# Patient Record
Sex: FEMALE | Race: WHITE | NOT HISPANIC OR LATINO | Employment: OTHER | URBAN - NONMETROPOLITAN AREA
[De-identification: names, ages, dates, MRNs, and addresses within clinical notes are randomized per-mention and may not be internally consistent; named-entity substitution may affect disease eponyms.]

---

## 2017-01-12 ENCOUNTER — OFFICE VISIT (OUTPATIENT)
Dept: OTOLARYNGOLOGY | Facility: CLINIC | Age: 67
End: 2017-01-12

## 2017-01-12 VITALS
SYSTOLIC BLOOD PRESSURE: 130 MMHG | HEIGHT: 62 IN | TEMPERATURE: 97.6 F | DIASTOLIC BLOOD PRESSURE: 80 MMHG | BODY MASS INDEX: 22.08 KG/M2 | WEIGHT: 120 LBS | HEART RATE: 68 BPM

## 2017-01-12 DIAGNOSIS — Q18.0 BRANCHIAL CLEFT CYST: Primary | ICD-10-CM

## 2017-01-12 PROCEDURE — 99213 OFFICE O/P EST LOW 20 MIN: CPT | Performed by: PHYSICIAN ASSISTANT

## 2017-01-12 RX ORDER — ALBUTEROL SULFATE 90 UG/1
AEROSOL, METERED RESPIRATORY (INHALATION) EVERY 6 HOURS
COMMUNITY
Start: 2016-12-20 | End: 2020-02-12 | Stop reason: SDUPTHER

## 2017-01-12 NOTE — MR AVS SNAPSHOT
"                        Viky Cortez   1/12/2017 1:00 PM   Office Visit    Dept Phone:  [phone]   Encounter #:  [id number]    Provider:  Quang Lopez MD   Department:  [facility]                Your Full Care Plan              Today's Medication Changes          These changes are accurate as of: 1/12/17  1:03 PM.  If you have any questions, ask your nurse or doctor.               Stop taking medication(s)listed here:     HYDROcodone-acetaminophen 5-325 MG per tablet   Commonly known as:  NORCO   Stopped by:  Quang Lopez MD                      Your Updated Medication List          This list is accurate as of: 1/12/17  1:03 PM.  Always use your most recent med list.                alendronate 70 MG tablet   Commonly known as:  FOSAMAX       lisinopril-hydrochlorothiazide 10-12.5 MG per tablet   Commonly known as:  PRINZIDE,ZESTORETIC       MULTIVITAMIN ADULT PO       PROAIR  (90 BASE) MCG/ACT inhaler   Generic drug:  albuterol               Instructions     None    Patient Instructions History      Upcoming Appointments     Visit Type Date Time Department    FOLLOW UP 1/12/2017  1:00 PM [facility]      MyChart Signup     Our records indicate that you have declined [facility] signup. If you would like to sign up for [employer], please email [email or web address] or call [phone] to obtain an activation code.             Other Info from Your Visit           Allergies     Influenza Vaccines        Reason for Visit     Follow-up Neck mass      Vital Signs     Blood Pressure Pulse Temperature Height Weight Body Mass Index    130/80 68 97.6 °F (36.4 °C) 62\" (157.5 cm) 120 lb (54.4 kg) 21.95 kg/m2    Smoking Status                   Current Every Day Smoker             "

## 2017-01-12 NOTE — PROGRESS NOTES
Patient Care Team:  Mert Cruz MD as PCP - General (Family Medicine)  Quang Lopez MD as Consulting Physician (Otolaryngology)    Chief Complaint   Patient presents with   • Follow-up     Neck mass        Subjective     Viky Cortez is a 66 y.o. female who presents for evaluation.  HPI Comments: Patient presents status post removal of a branchial cleft cyst on the right side. She has been doing well without complaints. Surgical site has healed well.      Review of Systems  Review of Systems   Constitutional: Negative for activity change, appetite change, chills, diaphoresis, fatigue, fever and unexpected weight change.   HENT: Negative for congestion, dental problem, drooling, ear discharge, ear pain, facial swelling, hearing loss, mouth sores, nosebleeds, postnasal drip, rhinorrhea, sinus pressure, sneezing, sore throat, tinnitus, trouble swallowing and voice change.    Eyes: Negative.    Respiratory: Negative.    Cardiovascular: Negative.    Gastrointestinal: Negative.    Endocrine: Negative.    Skin: Negative.    Allergic/Immunologic: Negative for environmental allergies, food allergies and immunocompromised state.   Neurological: Negative.    Hematological: Negative.    Psychiatric/Behavioral: Negative.        History  Past Medical History   Diagnosis Date   • Hypertension    • Joint pain    • Neck mass      RIGHT SIDE   • Osteoporosis      Past Surgical History   Procedure Laterality Date   • Knee surgery     • Meniscectomy Left    • Panendoscopy N/A 9/30/2016     Procedure: PANENDOSCOPY;  Surgeon: Quang Lopez MD;  Location: [facility] OR;  Service:    • Excision mass head/neck Right 9/30/2016     Procedure: EXCISION MASS HEAD NECK;  Surgeon: Quang Lopez MD;  Location: [facility] OR;  Service:    • Eye surgery       Family History   Problem Relation Age of Onset   • No Known Problems Mother    • No Known Problems Father      Social History   Substance Use Topics   • Smoking status:  Current Every Day Smoker     Packs/day: 1.00     Types: Cigarettes   • Smokeless tobacco: None   • Alcohol use 0.6 oz/week     1 Shots of liquor per week      Comment: once nightly       Current Outpatient Prescriptions:   •  albuterol (PROAIR HFA) 108 (90 BASE) MCG/ACT inhaler, Every 6 (Six) Hours., Disp: , Rfl:   •  alendronate (FOSAMAX) 70 MG tablet, TAKE ONE TABLET BY MOUTH ONCE A WEEK, Disp: , Rfl:   •  lisinopril-hydrochlorothiazide (PRINZIDE,ZESTORETIC) 10-12.5 MG per tablet, Take 1 tablet by mouth Daily., Disp: , Rfl:   •  Multiple Vitamins-Minerals (MULTIVITAMIN ADULT PO), Take  by mouth., Disp: , Rfl:   Allergies:  Influenza vaccines    Objective     Vital Signs  Temp:  [97.6 °F (36.4 °C)] 97.6 °F (36.4 °C)  Heart Rate:  [68] 68  BP: (130)/(80) 130/80    Physical Exam:  Physical Exam   Constitutional: Vital signs are normal. She appears well-developed and well-nourished. No distress.   HENT:   Head: Normocephalic and atraumatic.   Right Ear: External ear normal.   Left Ear: External ear normal.   Mouth/Throat: Uvula is midline, oropharynx is clear and moist and mucous membranes are normal. No oral lesions. No trismus in the jaw. No uvula swelling. No oropharyngeal exudate, posterior oropharyngeal edema, posterior oropharyngeal erythema or tonsillar abscesses. No tonsillar exudate.   Eyes: Conjunctivae and EOM are normal. Pupils are equal, round, and reactive to light. No scleral icterus.   Neck: Trachea normal and phonation normal. No tracheal tenderness present. No tracheal deviation present. No thyroid mass and no thyromegaly present.   Pulmonary/Chest: Effort normal. No stridor.   Lymphadenopathy:        Head (right side): No submental, no submandibular, no tonsillar, no preauricular, no posterior auricular and no occipital adenopathy present.        Head (left side): No submental, no submandibular, no tonsillar, no preauricular, no posterior auricular and no occipital adenopathy present.     She has no  "cervical adenopathy.        Right cervical: No superficial cervical, no deep cervical and no posterior cervical adenopathy present.       Left cervical: No superficial cervical, no deep cervical and no posterior cervical adenopathy present.   Neurological: She is alert. No cranial nerve deficit.   Skin: Skin is warm and dry. No rash noted. She is not diaphoretic. No erythema. No pallor.   Psychiatric: She has a normal mood and affect. Her behavior is normal. Judgment and thought content normal.   Vitals reviewed.      Results Review:   I reviewed the patient's new clinical results.  Tissue Exam   Order: [id number]   Status:  Final result   Visible to patient:  No (Not Released)   Dx:  Mass of right side of neck      3mo ago     Clinical Information       Pre-Op Diagnosis: Mass of right side of neck.       Final Diagnosis   1. Right neck mass, excision:   Inflamed branchial cleft cyst.   Negative for malignancy.      2. Lymph node, right neck, excision:   One reactive lymph node.      1   Electronically signed by Sonu Villalpando MD on 10/6/2016 at 0751   Intraoperative Consultation       Mass, right neck, excision:  Lymphoid tissue and necrotic debris on representative section. Defer to permanent for final diagnosis.     Reported to Dr. Quang Lopez on 9/30/2016 at 3:48 PM.   Gross Description       1. Specimen #1 is received in formalin, labeled with the patient's name, medical record number and designated \"right neck mass.\" The specimen consists of an intact cystic structure measuring 4.6 x 3.1 x 2.9 cm. The external surface is smooth with a small amount of soft tissue adhesions. The external surface is inked blue. The cut surface reveals a cystic structure filled with yellow opaque pasty material. The cyst walls appear smooth. The walls of the cyst average 0.1 cm in thickness. One pole of this cyst shows thicker pink-gray walls averaging 0.4 cm in greatest dimension. A representative section of cyst wall, including " "thicker area, is submitted for frozen section. The frozen section remnant is submitted in (block 1A). The remaining specimen is submitted in (blocks 1B through 1G).   JBT/js     2. Specimen #2 is received in formalin, labeled with the patient's name, date of birth, medical record number and designated \"right neck lymph node\". The specimen consists of two red-pink fragments of lymph node tissue aggregating to 1.3 x 1.1 x 0.8 cm. The external surface shows disruption with cautery artifact. The fragments are totally submitted, intact, in (block 2A).   JBT/ltw    Microscopic Description       1. Sections demonstrate a cystic lesion lined partially by benign squamous epithelium. There is a large area where the epithelial lining is denuded. Numerous macrophages and acute inflammatory cells as well as denuded keratinocytes with reactive atypia are present in this area. No evidence of malignancy is identified.      2. Sections demonstrate benign lymphoid tissue with reactive diffuse hyperplasia and follicular hyperplasia. No evidence of malignancy is identified.   DRW/js             Assessment/Plan     Problems Addressed this Visit        Other    Branchial cleft cyst - Primary          My findings and recommendations were discussed and questions were answered.     Return if symptoms worsen or fail to improve.    TIFFANIE Marks  01/12/17  1:04 PM        "

## 2017-01-23 RX ORDER — ALENDRONATE SODIUM 70 MG/1
TABLET ORAL
Qty: 4 TABLET | Refills: 5 | Status: SHIPPED | OUTPATIENT
Start: 2017-01-23 | End: 2017-08-19 | Stop reason: SDUPTHER

## 2017-04-30 DIAGNOSIS — I10 ESSENTIAL HYPERTENSION: ICD-10-CM

## 2017-05-01 RX ORDER — LISINOPRIL AND HYDROCHLOROTHIAZIDE 12.5; 1 MG/1; MG/1
TABLET ORAL
Qty: 30 TABLET | Refills: 5 | Status: SHIPPED | OUTPATIENT
Start: 2017-05-01 | End: 2017-11-26 | Stop reason: SDUPTHER

## 2017-08-21 RX ORDER — ALENDRONATE SODIUM 70 MG/1
TABLET ORAL
Qty: 4 TABLET | Refills: 5 | Status: SHIPPED | OUTPATIENT
Start: 2017-08-21 | End: 2018-03-23 | Stop reason: SDUPTHER

## 2017-08-23 ENCOUNTER — OFFICE VISIT (OUTPATIENT)
Dept: FAMILY MEDICINE CLINIC | Age: 67
End: 2017-08-23
Payer: MEDICARE

## 2017-08-23 VITALS
WEIGHT: 120 LBS | DIASTOLIC BLOOD PRESSURE: 78 MMHG | RESPIRATION RATE: 16 BRPM | OXYGEN SATURATION: 97 % | TEMPERATURE: 98.2 F | HEIGHT: 62 IN | SYSTOLIC BLOOD PRESSURE: 126 MMHG | HEART RATE: 70 BPM | BODY MASS INDEX: 22.08 KG/M2

## 2017-08-23 DIAGNOSIS — Z12.31 SCREENING MAMMOGRAM, ENCOUNTER FOR: ICD-10-CM

## 2017-08-23 DIAGNOSIS — M81.0 OSTEOPOROSIS, UNSPECIFIED OSTEOPOROSIS TYPE, UNSPECIFIED PATHOLOGICAL FRACTURE PRESENCE: ICD-10-CM

## 2017-08-23 DIAGNOSIS — I10 ESSENTIAL HYPERTENSION: Primary | ICD-10-CM

## 2017-08-23 PROCEDURE — G8399 PT W/DXA RESULTS DOCUMENT: HCPCS | Performed by: FAMILY MEDICINE

## 2017-08-23 PROCEDURE — 4004F PT TOBACCO SCREEN RCVD TLK: CPT | Performed by: FAMILY MEDICINE

## 2017-08-23 PROCEDURE — 99214 OFFICE O/P EST MOD 30 MIN: CPT | Performed by: FAMILY MEDICINE

## 2017-08-23 PROCEDURE — 3014F SCREEN MAMMO DOC REV: CPT | Performed by: FAMILY MEDICINE

## 2017-08-23 PROCEDURE — 1123F ACP DISCUSS/DSCN MKR DOCD: CPT | Performed by: FAMILY MEDICINE

## 2017-08-23 PROCEDURE — 4005F PHARM THX FOR OP RXD: CPT | Performed by: FAMILY MEDICINE

## 2017-08-23 PROCEDURE — G8420 CALC BMI NORM PARAMETERS: HCPCS | Performed by: FAMILY MEDICINE

## 2017-08-23 PROCEDURE — 1090F PRES/ABSN URINE INCON ASSESS: CPT | Performed by: FAMILY MEDICINE

## 2017-08-23 PROCEDURE — 3017F COLORECTAL CA SCREEN DOC REV: CPT | Performed by: FAMILY MEDICINE

## 2017-08-23 PROCEDURE — 4040F PNEUMOC VAC/ADMIN/RCVD: CPT | Performed by: FAMILY MEDICINE

## 2017-08-23 PROCEDURE — G8427 DOCREV CUR MEDS BY ELIG CLIN: HCPCS | Performed by: FAMILY MEDICINE

## 2017-08-23 ASSESSMENT — ENCOUNTER SYMPTOMS
ALLERGIC/IMMUNOLOGIC NEGATIVE: 1
RESPIRATORY NEGATIVE: 1
EYES NEGATIVE: 1
GASTROINTESTINAL NEGATIVE: 1

## 2017-08-25 DIAGNOSIS — M81.0 OSTEOPOROSIS, UNSPECIFIED OSTEOPOROSIS TYPE, UNSPECIFIED PATHOLOGICAL FRACTURE PRESENCE: ICD-10-CM

## 2017-08-25 DIAGNOSIS — I10 ESSENTIAL HYPERTENSION: ICD-10-CM

## 2017-08-25 LAB
ALBUMIN SERPL-MCNC: 4.1 G/DL (ref 3.5–5.2)
ALP BLD-CCNC: 51 U/L (ref 35–104)
ALT SERPL-CCNC: 11 U/L (ref 5–33)
ANION GAP SERPL CALCULATED.3IONS-SCNC: 13 MMOL/L (ref 7–19)
AST SERPL-CCNC: 19 U/L (ref 5–32)
BILIRUB SERPL-MCNC: 0.5 MG/DL (ref 0.2–1.2)
BILIRUBIN URINE: NEGATIVE
BLOOD, URINE: NEGATIVE
BUN BLDV-MCNC: 15 MG/DL (ref 8–23)
CALCIUM SERPL-MCNC: 9.4 MG/DL (ref 8.8–10.2)
CHLORIDE BLD-SCNC: 98 MMOL/L (ref 98–111)
CHOLESTEROL, TOTAL: 174 MG/DL (ref 160–199)
CLARITY: CLEAR
CO2: 30 MMOL/L (ref 22–29)
COLOR: YELLOW
CREAT SERPL-MCNC: 1 MG/DL (ref 0.5–0.9)
GFR NON-AFRICAN AMERICAN: 55
GLUCOSE BLD-MCNC: 79 MG/DL (ref 74–109)
GLUCOSE URINE: NEGATIVE MG/DL
HCT VFR BLD CALC: 42.6 % (ref 37–47)
HDLC SERPL-MCNC: 91 MG/DL (ref 65–121)
HEMOGLOBIN: 14.3 G/DL (ref 12–16)
KETONES, URINE: NEGATIVE MG/DL
LDL CHOLESTEROL CALCULATED: 64 MG/DL
LEUKOCYTE ESTERASE, URINE: NEGATIVE
MCH RBC QN AUTO: 34.6 PG (ref 27–31)
MCHC RBC AUTO-ENTMCNC: 33.6 G/DL (ref 33–37)
MCV RBC AUTO: 103.1 FL (ref 81–99)
NITRITE, URINE: NEGATIVE
PDW BLD-RTO: 12.7 % (ref 11.5–14.5)
PH UA: 7
PLATELET # BLD: 214 K/UL (ref 130–400)
PMV BLD AUTO: 9.9 FL (ref 9.4–12.3)
POTASSIUM SERPL-SCNC: 3.9 MMOL/L (ref 3.5–5)
PROTEIN UA: NEGATIVE MG/DL
RBC # BLD: 4.13 M/UL (ref 4.2–5.4)
SODIUM BLD-SCNC: 141 MMOL/L (ref 136–145)
SPECIFIC GRAVITY UA: 1.01
TOTAL PROTEIN: 7.2 G/DL (ref 6.6–8.7)
TRIGL SERPL-MCNC: 96 MG/DL (ref 150–199)
TSH SERPL DL<=0.05 MIU/L-ACNC: 3.7 UIU/ML (ref 0.27–4.2)
UROBILINOGEN, URINE: 0.2 E.U./DL
VITAMIN D 25-HYDROXY: 42.8 NG/ML
WBC # BLD: 6.5 K/UL (ref 4.8–10.8)

## 2017-08-30 ENCOUNTER — HOSPITAL ENCOUNTER (OUTPATIENT)
Dept: WOMENS IMAGING | Age: 67
Discharge: HOME | End: 2017-08-30
Payer: MEDICARE

## 2017-08-30 DIAGNOSIS — M81.0 OSTEOPOROSIS, UNSPECIFIED OSTEOPOROSIS TYPE, UNSPECIFIED PATHOLOGICAL FRACTURE PRESENCE: ICD-10-CM

## 2017-08-30 DIAGNOSIS — Z12.31 SCREENING MAMMOGRAM, ENCOUNTER FOR: ICD-10-CM

## 2017-08-30 PROCEDURE — G0202 SCR MAMMO BI INCL CAD: HCPCS

## 2017-08-30 PROCEDURE — 77080 DXA BONE DENSITY AXIAL: CPT

## 2017-09-06 ENCOUNTER — OFFICE VISIT (OUTPATIENT)
Dept: FAMILY MEDICINE CLINIC | Age: 67
End: 2017-09-06
Payer: MEDICARE

## 2017-09-06 VITALS
BODY MASS INDEX: 22.08 KG/M2 | DIASTOLIC BLOOD PRESSURE: 72 MMHG | WEIGHT: 120 LBS | HEIGHT: 62 IN | OXYGEN SATURATION: 97 % | RESPIRATION RATE: 16 BRPM | HEART RATE: 70 BPM | SYSTOLIC BLOOD PRESSURE: 120 MMHG | TEMPERATURE: 98.4 F

## 2017-09-06 DIAGNOSIS — M81.0 OSTEOPOROSIS, UNSPECIFIED OSTEOPOROSIS TYPE, UNSPECIFIED PATHOLOGICAL FRACTURE PRESENCE: Primary | ICD-10-CM

## 2017-09-06 DIAGNOSIS — I10 ESSENTIAL HYPERTENSION: ICD-10-CM

## 2017-09-06 PROCEDURE — 1123F ACP DISCUSS/DSCN MKR DOCD: CPT | Performed by: FAMILY MEDICINE

## 2017-09-06 PROCEDURE — 1090F PRES/ABSN URINE INCON ASSESS: CPT | Performed by: FAMILY MEDICINE

## 2017-09-06 PROCEDURE — 3014F SCREEN MAMMO DOC REV: CPT | Performed by: FAMILY MEDICINE

## 2017-09-06 PROCEDURE — G8427 DOCREV CUR MEDS BY ELIG CLIN: HCPCS | Performed by: FAMILY MEDICINE

## 2017-09-06 PROCEDURE — G8420 CALC BMI NORM PARAMETERS: HCPCS | Performed by: FAMILY MEDICINE

## 2017-09-06 PROCEDURE — 99213 OFFICE O/P EST LOW 20 MIN: CPT | Performed by: FAMILY MEDICINE

## 2017-09-06 PROCEDURE — 4005F PHARM THX FOR OP RXD: CPT | Performed by: FAMILY MEDICINE

## 2017-09-06 PROCEDURE — 3017F COLORECTAL CA SCREEN DOC REV: CPT | Performed by: FAMILY MEDICINE

## 2017-09-06 PROCEDURE — 4004F PT TOBACCO SCREEN RCVD TLK: CPT | Performed by: FAMILY MEDICINE

## 2017-09-06 PROCEDURE — 4040F PNEUMOC VAC/ADMIN/RCVD: CPT | Performed by: FAMILY MEDICINE

## 2017-09-06 PROCEDURE — G8399 PT W/DXA RESULTS DOCUMENT: HCPCS | Performed by: FAMILY MEDICINE

## 2017-09-06 ASSESSMENT — ENCOUNTER SYMPTOMS
EYES NEGATIVE: 1
ALLERGIC/IMMUNOLOGIC NEGATIVE: 1
GASTROINTESTINAL NEGATIVE: 1
RESPIRATORY NEGATIVE: 1

## 2017-11-26 DIAGNOSIS — I10 ESSENTIAL HYPERTENSION: ICD-10-CM

## 2017-11-27 RX ORDER — LISINOPRIL AND HYDROCHLOROTHIAZIDE 12.5; 1 MG/1; MG/1
TABLET ORAL
Qty: 30 TABLET | Refills: 5 | Status: SHIPPED | OUTPATIENT
Start: 2017-11-27 | End: 2018-07-28 | Stop reason: SDUPTHER

## 2018-07-28 DIAGNOSIS — I10 ESSENTIAL HYPERTENSION: ICD-10-CM

## 2018-07-30 RX ORDER — LISINOPRIL AND HYDROCHLOROTHIAZIDE 12.5; 1 MG/1; MG/1
TABLET ORAL
Qty: 30 TABLET | Refills: 5 | Status: SHIPPED | OUTPATIENT
Start: 2018-07-30 | End: 2018-08-02 | Stop reason: SDUPTHER

## 2018-08-02 DIAGNOSIS — I10 ESSENTIAL HYPERTENSION: ICD-10-CM

## 2018-08-02 RX ORDER — LISINOPRIL AND HYDROCHLOROTHIAZIDE 12.5; 1 MG/1; MG/1
1 TABLET ORAL DAILY
Qty: 90 TABLET | Refills: 5 | Status: SHIPPED | OUTPATIENT
Start: 2018-08-02 | End: 2019-01-01 | Stop reason: SDUPTHER

## 2018-09-10 ENCOUNTER — TELEPHONE (OUTPATIENT)
Dept: FAMILY MEDICINE CLINIC | Age: 68
End: 2018-09-10

## 2018-09-17 DIAGNOSIS — Z00.00 MEDICARE ANNUAL WELLNESS VISIT, SUBSEQUENT: Primary | ICD-10-CM

## 2018-09-26 DIAGNOSIS — Z00.00 MEDICARE ANNUAL WELLNESS VISIT, SUBSEQUENT: ICD-10-CM

## 2018-09-26 LAB
ALBUMIN SERPL-MCNC: 4.3 G/DL (ref 3.5–5.2)
ALP BLD-CCNC: 48 U/L (ref 35–104)
ALT SERPL-CCNC: 8 U/L (ref 5–33)
ANION GAP SERPL CALCULATED.3IONS-SCNC: 11 MMOL/L (ref 7–19)
AST SERPL-CCNC: 16 U/L (ref 5–32)
BILIRUB SERPL-MCNC: 0.5 MG/DL (ref 0.2–1.2)
BILIRUBIN URINE: NEGATIVE
BLOOD, URINE: NEGATIVE
BUN BLDV-MCNC: 11 MG/DL (ref 8–23)
CALCIUM SERPL-MCNC: 9.2 MG/DL (ref 8.8–10.2)
CHLORIDE BLD-SCNC: 102 MMOL/L (ref 98–111)
CHOLESTEROL, TOTAL: 172 MG/DL (ref 160–199)
CLARITY: CLEAR
CO2: 27 MMOL/L (ref 22–29)
COLOR: YELLOW
CREAT SERPL-MCNC: 0.9 MG/DL (ref 0.5–0.9)
CREATININE URINE: 45.3 MG/DL (ref 4.2–622)
GFR NON-AFRICAN AMERICAN: >60
GLUCOSE BLD-MCNC: 82 MG/DL (ref 74–109)
GLUCOSE URINE: NEGATIVE MG/DL
HBA1C MFR BLD: 5.4 % (ref 4–6)
HCT VFR BLD CALC: 40.9 % (ref 37–47)
HDLC SERPL-MCNC: 74 MG/DL (ref 65–121)
HEMOGLOBIN: 13.4 G/DL (ref 12–16)
KETONES, URINE: NEGATIVE MG/DL
LDL CHOLESTEROL CALCULATED: 75 MG/DL
LEUKOCYTE ESTERASE, URINE: NEGATIVE
MCH RBC QN AUTO: 33.4 PG (ref 27–31)
MCHC RBC AUTO-ENTMCNC: 32.8 G/DL (ref 33–37)
MCV RBC AUTO: 102 FL (ref 81–99)
MICROALBUMIN UR-MCNC: <1.2 MG/DL (ref 0–19)
MICROALBUMIN/CREAT UR-RTO: NORMAL MG/G
NITRITE, URINE: NEGATIVE
PDW BLD-RTO: 12.9 % (ref 11.5–14.5)
PH UA: 6
PLATELET # BLD: 231 K/UL (ref 130–400)
PMV BLD AUTO: 9.4 FL (ref 9.4–12.3)
POTASSIUM SERPL-SCNC: 4.1 MMOL/L (ref 3.5–5)
PROTEIN UA: NEGATIVE MG/DL
RBC # BLD: 4.01 M/UL (ref 4.2–5.4)
SODIUM BLD-SCNC: 140 MMOL/L (ref 136–145)
SPECIFIC GRAVITY UA: 1.01
TOTAL PROTEIN: 6.6 G/DL (ref 6.6–8.7)
TRIGL SERPL-MCNC: 116 MG/DL (ref 0–149)
TSH SERPL DL<=0.05 MIU/L-ACNC: 2.32 UIU/ML (ref 0.27–4.2)
UROBILINOGEN, URINE: 0.2 E.U./DL
WBC # BLD: 4.9 K/UL (ref 4.8–10.8)

## 2018-10-01 ENCOUNTER — HOSPITAL ENCOUNTER (OUTPATIENT)
Dept: GENERAL RADIOLOGY | Age: 68
Discharge: HOME | End: 2018-10-01
Payer: MEDICARE

## 2018-10-01 ENCOUNTER — OFFICE VISIT (OUTPATIENT)
Dept: FAMILY MEDICINE CLINIC | Age: 68
End: 2018-10-01
Payer: MEDICARE

## 2018-10-01 VITALS
HEIGHT: 62 IN | OXYGEN SATURATION: 97 % | DIASTOLIC BLOOD PRESSURE: 82 MMHG | BODY MASS INDEX: 23 KG/M2 | WEIGHT: 125 LBS | SYSTOLIC BLOOD PRESSURE: 120 MMHG | TEMPERATURE: 98.2 F | HEART RATE: 73 BPM

## 2018-10-01 DIAGNOSIS — Z12.2 ENCOUNTER FOR SCREENING FOR LUNG CANCER: ICD-10-CM

## 2018-10-01 DIAGNOSIS — Z00.00 ROUTINE GENERAL MEDICAL EXAMINATION AT A HEALTH CARE FACILITY: Primary | ICD-10-CM

## 2018-10-01 DIAGNOSIS — Z87.891 PERSONAL HISTORY OF TOBACCO USE: ICD-10-CM

## 2018-10-01 PROCEDURE — G8484 FLU IMMUNIZE NO ADMIN: HCPCS | Performed by: FAMILY MEDICINE

## 2018-10-01 PROCEDURE — G0296 VISIT TO DETERM LDCT ELIG: HCPCS | Performed by: FAMILY MEDICINE

## 2018-10-01 PROCEDURE — G0438 PPPS, INITIAL VISIT: HCPCS | Performed by: FAMILY MEDICINE

## 2018-10-01 PROCEDURE — 4040F PNEUMOC VAC/ADMIN/RCVD: CPT | Performed by: FAMILY MEDICINE

## 2018-10-01 PROCEDURE — G0297 LDCT FOR LUNG CA SCREEN: HCPCS

## 2018-10-01 ASSESSMENT — LIFESTYLE VARIABLES
HOW OFTEN DURING THE LAST YEAR HAVE YOU FOUND THAT YOU WERE NOT ABLE TO STOP DRINKING ONCE YOU HAD STARTED: 0
HOW MANY STANDARD DRINKS CONTAINING ALCOHOL DO YOU HAVE ON A TYPICAL DAY: 0
HOW OFTEN DURING THE LAST YEAR HAVE YOU NEEDED AN ALCOHOLIC DRINK FIRST THING IN THE MORNING TO GET YOURSELF GOING AFTER A NIGHT OF HEAVY DRINKING: 0
AUDIT-C TOTAL SCORE: 4
HOW OFTEN DURING THE LAST YEAR HAVE YOU BEEN UNABLE TO REMEMBER WHAT HAPPENED THE NIGHT BEFORE BECAUSE YOU HAD BEEN DRINKING: 0
HAS A RELATIVE, FRIEND, DOCTOR, OR ANOTHER HEALTH PROFESSIONAL EXPRESSED CONCERN ABOUT YOUR DRINKING OR SUGGESTED YOU CUT DOWN: 0
AUDIT TOTAL SCORE: 4
HOW OFTEN DO YOU HAVE SIX OR MORE DRINKS ON ONE OCCASION: 0
HAVE YOU OR SOMEONE ELSE BEEN INJURED AS A RESULT OF YOUR DRINKING: 0
HOW OFTEN DO YOU HAVE A DRINK CONTAINING ALCOHOL: 4
HOW OFTEN DURING THE LAST YEAR HAVE YOU FAILED TO DO WHAT WAS NORMALLY EXPECTED FROM YOU BECAUSE OF DRINKING: 0
HOW OFTEN DURING THE LAST YEAR HAVE YOU HAD A FEELING OF GUILT OR REMORSE AFTER DRINKING: 0

## 2018-10-01 ASSESSMENT — PATIENT HEALTH QUESTIONNAIRE - PHQ9
SUM OF ALL RESPONSES TO PHQ QUESTIONS 1-9: 0
SUM OF ALL RESPONSES TO PHQ QUESTIONS 1-9: 0

## 2018-10-01 ASSESSMENT — ANXIETY QUESTIONNAIRES: GAD7 TOTAL SCORE: 0

## 2018-10-01 NOTE — PROGRESS NOTES
increased physical activity    Hearing/Vision:  Hearing/Vision  Do you or your family notice any trouble with your hearing?: No  Do you have difficulty driving, watching TV, or doing any of your daily activities because of your eyesight?: No  Have you had an eye exam within the past year?: (!) No  Hearing/Vision Interventions:  · None indicated    Safety:  Safety  Do you have working smoke detectors?: Yes  Have all throw rugs been removed or fastened?: (!) No  Do you have non-slip mats in all bathtubs?: Yes  Do all of your stairways have a railing or banister?: Yes  Are your doorways, halls and stairs free of clutter?: Yes  Do you always fasten your seatbelt when you are in a car?: Yes  Safety Interventions:  · Home safety tips provided    Personalized Preventive Plan   Current Health Maintenance Status  Immunization History   Administered Date(s) Administered    Pneumococcal 13-valent Conjugate (Uagvpaq51) 02/08/2016    Pneumococcal Polysaccharide (Cmihcjnpj66) 01/14/2015    Zoster Live (Zostavax) 02/04/2015        Health Maintenance   Topic Date Due    Hepatitis C screen  1950    DTaP/Tdap/Td vaccine (1 - Tdap) 10/13/1968    Shingles Vaccine (1 of 2 - 2 Dose Series) 04/04/2015    Breast cancer screen  08/30/2019    Potassium monitoring  09/26/2019    Creatinine monitoring  09/26/2019    Lipid screen  09/26/2023    Colon cancer screen colonoscopy  03/16/2025    DEXA (modify frequency per FRAX score)  Addressed    Pneumococcal low/med risk  Completed     Recommendations for Preventive Services Due: see orders and patient instructions/AVS.  .   Recommended screening schedule for the next 5-10 years is provided to the patient in written form: see Patient Instructions/AVS.      Low Dose CT (LDCT) Lung Screening criteria met   Age 50-69   Pack year smoking >30   Still smoking or less than 15 year since quit   No sign or symptoms of lung cancer   > 11 months since last LDCT     Risks and benefits

## 2018-10-01 NOTE — PATIENT INSTRUCTIONS
on your own, your heart stops, or you're unable to swallow. · What things would you still want to be able to do after you receive life-support methods? Would you want to be able to walk? To speak? To eat on your own? To live without the help of machines? · If you have a choice, where do you want to be cared for? In your home? At a hospital or nursing home? · Do you want certain Gnosticist practices performed if you become very ill? · If you have a choice at the end of your life, where would you prefer to die? At home? In a hospital or nursing home? Somewhere else? · Would you prefer to be buried or cremated? · Do you want your organs to be donated after you die? What should you do with your living will? · Make sure that your family members and your health care agent have copies of your living will. · Give your doctor a copy of your living will to keep in your medical record. If you have more than one doctor, make sure that each one has a copy. · You may want to put a copy of your living will where it can be easily found. Where can you learn more? Go to https://Swyftpemy4oneone.[email or web address]. org and sign in to your [employer] account. Enter D272 in the SCL box to learn more about \"Learning About Living [place]. \"     If you do not have an account, please click on the \"Sign Up Now\" link. Current as of: October 6, 2017  Content Version: 11.7  © 3966-1150 [address], [place]. Care instructions adapted under license by [facility] ([facility]). If you have questions about a medical condition or this instruction, always ask your healthcare professional. [place] [address] any warranty or liability for your use of this information. Stopping Smoking: Care Instructions  Your Care Instructions  Cigarette smokers crave the nicotine in cigarettes. Giving it up is much harder than simply changing a habit. Your body has to stop craving the nicotine. It is hard to quit, but you can do it. There are many tools that people use to quit smoking. You may find that combining tools works best for you. There are several steps to quitting. First you get ready to quit. Then you get support to help you. After that, you learn new skills and behaviors to become a nonsmoker. For many people, a necessary step is getting and using medicine. Your doctor will help you set up the plan that best meets your needs. You may want to attend a smoking cessation program to help you quit smoking. When you choose a program, look for one that has proven success. Ask your doctor for ideas. You will greatly increase your chances of success if you take medicine as well as get counseling or join a cessation program.  Some of the changes you feel when you first quit tobacco are uncomfortable. Your body will miss the nicotine at first, and you may feel short-tempered and grumpy. You may have trouble sleeping or concentrating. Medicine can help you deal with these symptoms. You may struggle with changing your smoking habits and rituals. The last step is the tricky one: Be prepared for the smoking urge to continue for a time. This is a lot to deal with, but keep at it. You will feel better. Follow-up care is a key part of your treatment and safety. Be sure to make and go to all appointments, and call your doctor if you are having problems. It's also a good idea to know your test results and keep a list of the medicines you take. How can you care for yourself at home? · Ask your family, friends, and coworkers for support. You have a better chance of quitting if you have help and support. · Join a support group, such as Nicotine Anonymous, for people who are trying to quit smoking. · Consider signing up for a smoking cessation program, such as the American Lung Association's Freedom from Smoking program.  · Get text messaging support. Go to the website at www.smokefree. gov to sign up for the  program.  · Set a quit date. performed by an eye specialist is recommended every 1-2 years to screen for glaucoma; cataracts, macular degeneration, and other eye disorders. · A preventive dental visit is recommended every 6 months. · Try to get at least 150 minutes of exercise per week or 10,000 steps per day on a pedometer . · Order or download the FREE \"Exercise & Physical Activity: Your Everyday Guide\" from [employer]. Call 1-425.870.4470 or search [employer] online. · You need 0783-0101 mg of calcium and 9734-9374 IU of vitamin D per day. It is possible to meet your calcium requirement with diet alone, but a vitamin D supplement is usually necessary to meet this goal.  · When exposed to the sun, use a sunscreen that protects against both UVA and UVB radiation with an SPF of 30 or greater. Reapply every 2 to 3 hours or after sweating, drying off with a towel, or swimming. · Always wear a seat belt when traveling in a car. Always wear a helmet when riding a bicycle or motorcycle. What is lung cancer screening? Lung cancer screening is a way in which doctors check the lungs for early signs of cancer in people who have no symptoms of lung cancer. A low-dose CT scan uses much less radiation than a normal CT scan and shows a more detailed image of the lungs than a standard X-ray. The goal of lung cancer screening is to find cancer early, before it has a chance to grow, spread, or cause problems. One large study found that smokers who were screened with low-dose CT scans were less likely to die of lung cancer than those who were screened with standard X-ray. Below is a summary of the things you need to know regarding screening for lung cancer with low-dose computed tomography (LDCT). This is a screening program that involves routine annual screening with LDCT studies of the lung. The LDCTs are done using low-dose radiation that is not thought to increase your cancer risk.   If you have

## 2018-10-08 DIAGNOSIS — Z12.31 ENCOUNTER FOR SCREENING MAMMOGRAM FOR BREAST CANCER: Primary | ICD-10-CM

## 2018-10-24 ENCOUNTER — HOSPITAL ENCOUNTER (OUTPATIENT)
Dept: WOMENS IMAGING | Age: 68
Discharge: HOME | End: 2018-10-24
Payer: MEDICARE

## 2018-10-24 DIAGNOSIS — Z12.31 ENCOUNTER FOR SCREENING MAMMOGRAM FOR BREAST CANCER: ICD-10-CM

## 2018-10-24 PROCEDURE — 77063 BREAST TOMOSYNTHESIS BI: CPT

## 2019-01-01 ENCOUNTER — TELEPHONE (OUTPATIENT)
Dept: OTHER | Age: 69
End: 2019-01-01

## 2019-01-01 ENCOUNTER — TELEPHONE (OUTPATIENT)
Dept: FAMILY MEDICINE CLINIC | Age: 69
End: 2019-01-01

## 2019-01-01 ENCOUNTER — HOSPITAL ENCOUNTER (OUTPATIENT)
Dept: GENERAL RADIOLOGY | Age: 69
Discharge: HOME | End: 2019-11-11
Payer: MEDICARE

## 2019-01-01 ENCOUNTER — OFFICE VISIT (OUTPATIENT)
Dept: FAMILY MEDICINE CLINIC | Age: 69
End: 2019-01-01
Payer: MEDICARE

## 2019-01-01 ENCOUNTER — HOSPITAL ENCOUNTER (OUTPATIENT)
Dept: GENERAL RADIOLOGY | Age: 69
Discharge: HOME | End: 2019-12-23
Payer: MEDICARE

## 2019-01-01 ENCOUNTER — HOSPITAL ENCOUNTER (OUTPATIENT)
Dept: GENERAL RADIOLOGY | Age: 69
Discharge: HOME | End: 2019-11-01
Payer: MEDICARE

## 2019-01-01 ENCOUNTER — HOSPITAL ENCOUNTER (OUTPATIENT)
Dept: GENERAL RADIOLOGY | Age: 69
Discharge: HOME | End: 2019-12-17
Payer: MEDICARE

## 2019-01-01 VITALS
WEIGHT: 125 LBS | BODY MASS INDEX: 22.86 KG/M2 | OXYGEN SATURATION: 98 % | SYSTOLIC BLOOD PRESSURE: 122 MMHG | TEMPERATURE: 97.9 F | DIASTOLIC BLOOD PRESSURE: 76 MMHG | HEART RATE: 78 BPM

## 2019-01-01 VITALS
HEIGHT: 62 IN | BODY MASS INDEX: 23.19 KG/M2 | OXYGEN SATURATION: 97 % | HEART RATE: 68 BPM | DIASTOLIC BLOOD PRESSURE: 70 MMHG | SYSTOLIC BLOOD PRESSURE: 120 MMHG | WEIGHT: 126 LBS | TEMPERATURE: 97.6 F

## 2019-01-01 VITALS
BODY MASS INDEX: 22.86 KG/M2 | SYSTOLIC BLOOD PRESSURE: 124 MMHG | HEART RATE: 91 BPM | TEMPERATURE: 97 F | OXYGEN SATURATION: 95 % | WEIGHT: 125 LBS | DIASTOLIC BLOOD PRESSURE: 76 MMHG

## 2019-01-01 DIAGNOSIS — R93.89 ABNORMAL CT OF THE CHEST: Primary | ICD-10-CM

## 2019-01-01 DIAGNOSIS — I10 ESSENTIAL HYPERTENSION: ICD-10-CM

## 2019-01-01 DIAGNOSIS — J44.1 COPD EXACERBATION (HCC): Primary | ICD-10-CM

## 2019-01-01 DIAGNOSIS — R93.89 ABNORMAL CHEST X-RAY: ICD-10-CM

## 2019-01-01 DIAGNOSIS — E55.9 VITAMIN D DEFICIENCY: Primary | ICD-10-CM

## 2019-01-01 DIAGNOSIS — Z00.00 ROUTINE GENERAL MEDICAL EXAMINATION AT A HEALTH CARE FACILITY: Primary | ICD-10-CM

## 2019-01-01 DIAGNOSIS — E55.9 VITAMIN D DEFICIENCY: ICD-10-CM

## 2019-01-01 DIAGNOSIS — E87.1 LOW SODIUM LEVELS: ICD-10-CM

## 2019-01-01 DIAGNOSIS — J44.1 COPD EXACERBATION (HCC): ICD-10-CM

## 2019-01-01 DIAGNOSIS — R91.8 ABNORMAL CT LUNG SCREENING: ICD-10-CM

## 2019-01-01 DIAGNOSIS — Z00.00 ROUTINE GENERAL MEDICAL EXAMINATION AT A HEALTH CARE FACILITY: ICD-10-CM

## 2019-01-01 DIAGNOSIS — Z87.891 PERSONAL HISTORY OF TOBACCO USE: ICD-10-CM

## 2019-01-01 DIAGNOSIS — E87.1 LOW SODIUM LEVELS: Primary | ICD-10-CM

## 2019-01-01 DIAGNOSIS — M65.331 TRIGGER MIDDLE FINGER OF RIGHT HAND: ICD-10-CM

## 2019-01-01 DIAGNOSIS — M81.0 OSTEOPOROSIS, UNSPECIFIED OSTEOPOROSIS TYPE, UNSPECIFIED PATHOLOGICAL FRACTURE PRESENCE: ICD-10-CM

## 2019-01-01 DIAGNOSIS — R68.89 FLU-LIKE SYMPTOMS: ICD-10-CM

## 2019-01-01 LAB
ALBUMIN SERPL-MCNC: 4.4 G/DL (ref 3.5–5.2)
ALBUMIN SERPL-MCNC: 4.7 G/DL (ref 3.5–5.2)
ALP BLD-CCNC: 62 U/L (ref 35–104)
ALP BLD-CCNC: 65 U/L (ref 35–104)
ALT SERPL-CCNC: 10 U/L (ref 5–33)
ALT SERPL-CCNC: 9 U/L (ref 5–33)
ANION GAP SERPL CALCULATED.3IONS-SCNC: 16 MMOL/L (ref 7–19)
ANION GAP SERPL CALCULATED.3IONS-SCNC: 19 MMOL/L (ref 7–19)
ANION GAP SERPL CALCULATED.3IONS-SCNC: 26 MMOL/L (ref 7–19)
AST SERPL-CCNC: 19 U/L (ref 5–32)
AST SERPL-CCNC: 24 U/L (ref 5–32)
BILIRUB SERPL-MCNC: 0.5 MG/DL (ref 0.2–1.2)
BILIRUB SERPL-MCNC: 0.6 MG/DL (ref 0.2–1.2)
BILIRUBIN URINE: NEGATIVE
BLOOD, URINE: NEGATIVE
BUN BLDV-MCNC: 10 MG/DL (ref 8–23)
BUN BLDV-MCNC: 12 MG/DL (ref 8–23)
BUN BLDV-MCNC: 7 MG/DL (ref 8–23)
CALCIUM SERPL-MCNC: 8.9 MG/DL (ref 8.8–10.2)
CALCIUM SERPL-MCNC: 9 MG/DL (ref 8.8–10.2)
CALCIUM SERPL-MCNC: 9.1 MG/DL (ref 8.8–10.2)
CHLORIDE BLD-SCNC: 78 MMOL/L (ref 98–111)
CHLORIDE BLD-SCNC: 79 MMOL/L (ref 98–111)
CHLORIDE BLD-SCNC: 87 MMOL/L (ref 98–111)
CHOLESTEROL, TOTAL: 189 MG/DL (ref 160–199)
CLARITY: CLEAR
CO2: 21 MMOL/L (ref 22–29)
CO2: 23 MMOL/L (ref 22–29)
CO2: 26 MMOL/L (ref 22–29)
COLOR: YELLOW
CREAT SERPL-MCNC: 0.9 MG/DL (ref 0.5–0.9)
GFR NON-AFRICAN AMERICAN: >60
GLUCOSE BLD-MCNC: 111 MG/DL (ref 74–109)
GLUCOSE BLD-MCNC: 118 MG/DL (ref 74–109)
GLUCOSE BLD-MCNC: 122 MG/DL (ref 74–109)
GLUCOSE URINE: NEGATIVE MG/DL
HBA1C MFR BLD: 5.3 % (ref 4–6)
HCT VFR BLD CALC: 38.2 % (ref 37–47)
HCT VFR BLD CALC: 40.7 % (ref 37–47)
HDLC SERPL-MCNC: 98 MG/DL (ref 65–121)
HEMOGLOBIN: 13.4 G/DL (ref 12–16)
HEMOGLOBIN: 13.7 G/DL (ref 12–16)
INFLUENZA A ANTIBODY: NORMAL
INFLUENZA B ANTIBODY: NORMAL
KETONES, URINE: NEGATIVE MG/DL
LDL CHOLESTEROL CALCULATED: 76 MG/DL
LEUKOCYTE ESTERASE, URINE: NEGATIVE
MCH RBC QN AUTO: 34.1 PG (ref 27–31)
MCH RBC QN AUTO: 34.6 PG (ref 27–31)
MCHC RBC AUTO-ENTMCNC: 32.9 G/DL (ref 33–37)
MCHC RBC AUTO-ENTMCNC: 35.9 G/DL (ref 33–37)
MCV RBC AUTO: 103.6 FL (ref 81–99)
MCV RBC AUTO: 96.5 FL (ref 81–99)
NITRITE, URINE: NEGATIVE
PDW BLD-RTO: 11.8 % (ref 11.5–14.5)
PDW BLD-RTO: 12.7 % (ref 11.5–14.5)
PH UA: 7 (ref 5–8)
PLATELET # BLD: 261 K/UL (ref 130–400)
PLATELET # BLD: 285 K/UL (ref 130–400)
PMV BLD AUTO: 8.7 FL (ref 9.4–12.3)
PMV BLD AUTO: 9.3 FL (ref 9.4–12.3)
POTASSIUM SERPL-SCNC: 3.6 MMOL/L (ref 3.5–5)
POTASSIUM SERPL-SCNC: 3.6 MMOL/L (ref 3.5–5)
POTASSIUM SERPL-SCNC: 4 MMOL/L (ref 3.5–5)
PROTEIN UA: NEGATIVE MG/DL
RBC # BLD: 3.93 M/UL (ref 4.2–5.4)
RBC # BLD: 3.96 M/UL (ref 4.2–5.4)
SODIUM BLD-SCNC: 120 MMOL/L (ref 136–145)
SODIUM BLD-SCNC: 126 MMOL/L (ref 136–145)
SODIUM BLD-SCNC: 129 MMOL/L (ref 136–145)
SPECIFIC GRAVITY UA: 1.01 (ref 1–1.03)
TOTAL PROTEIN: 7.1 G/DL (ref 6.6–8.7)
TOTAL PROTEIN: 7.2 G/DL (ref 6.6–8.7)
TRIGL SERPL-MCNC: 77 MG/DL (ref 0–149)
TSH SERPL DL<=0.05 MIU/L-ACNC: 2.38 UIU/ML (ref 0.27–4.2)
UROBILINOGEN, URINE: 1 E.U./DL
VITAMIN D 25-HYDROXY: 18.9 NG/ML
VITAMIN D 25-HYDROXY: 62.1 NG/ML
WBC # BLD: 4.4 K/UL (ref 4.8–10.8)
WBC # BLD: 6.3 K/UL (ref 4.8–10.8)

## 2019-01-01 PROCEDURE — 71046 X-RAY EXAM CHEST 2 VIEWS: CPT

## 2019-01-01 PROCEDURE — 3017F COLORECTAL CA SCREEN DOC REV: CPT | Performed by: FAMILY MEDICINE

## 2019-01-01 PROCEDURE — 99213 OFFICE O/P EST LOW 20 MIN: CPT | Performed by: FAMILY MEDICINE

## 2019-01-01 PROCEDURE — 6360000004 HC RX CONTRAST MEDICATION: Performed by: FAMILY MEDICINE

## 2019-01-01 PROCEDURE — G8484 FLU IMMUNIZE NO ADMIN: HCPCS | Performed by: FAMILY MEDICINE

## 2019-01-01 PROCEDURE — 71250 CT THORAX DX C-: CPT

## 2019-01-01 PROCEDURE — 4040F PNEUMOC VAC/ADMIN/RCVD: CPT | Performed by: FAMILY MEDICINE

## 2019-01-01 PROCEDURE — 1090F PRES/ABSN URINE INCON ASSESS: CPT | Performed by: FAMILY MEDICINE

## 2019-01-01 PROCEDURE — 71260 CT THORAX DX C+: CPT

## 2019-01-01 PROCEDURE — G0439 PPPS, SUBSEQ VISIT: HCPCS | Performed by: FAMILY MEDICINE

## 2019-01-01 PROCEDURE — G0296 VISIT TO DETERM LDCT ELIG: HCPCS | Performed by: FAMILY MEDICINE

## 2019-01-01 PROCEDURE — G0297 LDCT FOR LUNG CA SCREEN: HCPCS

## 2019-01-01 PROCEDURE — 1123F ACP DISCUSS/DSCN MKR DOCD: CPT | Performed by: FAMILY MEDICINE

## 2019-01-01 PROCEDURE — 3023F SPIROM DOC REV: CPT | Performed by: FAMILY MEDICINE

## 2019-01-01 PROCEDURE — G8399 PT W/DXA RESULTS DOCUMENT: HCPCS | Performed by: FAMILY MEDICINE

## 2019-01-01 PROCEDURE — G8427 DOCREV CUR MEDS BY ELIG CLIN: HCPCS | Performed by: FAMILY MEDICINE

## 2019-01-01 PROCEDURE — G8420 CALC BMI NORM PARAMETERS: HCPCS | Performed by: FAMILY MEDICINE

## 2019-01-01 PROCEDURE — 4004F PT TOBACCO SCREEN RCVD TLK: CPT | Performed by: FAMILY MEDICINE

## 2019-01-01 PROCEDURE — G8926 SPIRO NO PERF OR DOC: HCPCS | Performed by: FAMILY MEDICINE

## 2019-01-01 PROCEDURE — 94640 AIRWAY INHALATION TREATMENT: CPT | Performed by: FAMILY MEDICINE

## 2019-01-01 PROCEDURE — 87804 INFLUENZA ASSAY W/OPTIC: CPT | Performed by: FAMILY MEDICINE

## 2019-01-01 PROCEDURE — 96372 THER/PROPH/DIAG INJ SC/IM: CPT | Performed by: FAMILY MEDICINE

## 2019-01-01 RX ORDER — TRIAMCINOLONE ACETONIDE 40 MG/ML
40 INJECTION, SUSPENSION INTRA-ARTICULAR; INTRAMUSCULAR ONCE
Status: COMPLETED | OUTPATIENT
Start: 2019-01-01 | End: 2019-01-01

## 2019-01-01 RX ORDER — PREDNISONE 20 MG/1
20 TABLET ORAL 2 TIMES DAILY
Qty: 10 TABLET | Refills: 0 | Status: SHIPPED | OUTPATIENT
Start: 2019-01-01 | End: 2019-01-01

## 2019-01-01 RX ORDER — CEFDINIR 300 MG/1
300 CAPSULE ORAL 2 TIMES DAILY
Qty: 20 CAPSULE | Refills: 0 | Status: SHIPPED | OUTPATIENT
Start: 2019-01-01 | End: 2019-01-01

## 2019-01-01 RX ORDER — ALBUTEROL SULFATE 2.5 MG/3ML
2.5 SOLUTION RESPIRATORY (INHALATION) EVERY 6 HOURS PRN
Qty: 120 EACH | Refills: 3 | Status: SHIPPED | OUTPATIENT
Start: 2019-01-01

## 2019-01-01 RX ORDER — LISINOPRIL AND HYDROCHLOROTHIAZIDE 12.5; 1 MG/1; MG/1
TABLET ORAL
Qty: 90 TABLET | Refills: 3 | Status: SHIPPED | OUTPATIENT
Start: 2019-01-01 | End: 2019-01-01 | Stop reason: ALTCHOICE

## 2019-01-01 RX ORDER — LISINOPRIL 10 MG/1
10 TABLET ORAL DAILY
Qty: 30 TABLET | Refills: 5 | Status: SHIPPED | OUTPATIENT
Start: 2019-01-01

## 2019-01-01 RX ORDER — DEXAMETHASONE SODIUM PHOSPHATE 4 MG/ML
4 INJECTION, SOLUTION INTRA-ARTICULAR; INTRALESIONAL; INTRAMUSCULAR; INTRAVENOUS; SOFT TISSUE ONCE
Status: COMPLETED | OUTPATIENT
Start: 2019-01-01 | End: 2019-01-01

## 2019-01-01 RX ORDER — ALENDRONATE SODIUM 70 MG/1
TABLET ORAL
Qty: 12 TABLET | Refills: 3 | Status: SHIPPED | OUTPATIENT
Start: 2019-01-01 | End: 2019-01-01

## 2019-01-01 RX ORDER — LISINOPRIL AND HYDROCHLOROTHIAZIDE 12.5; 1 MG/1; MG/1
TABLET ORAL
Qty: 30 TABLET | Refills: 0 | Status: SHIPPED | OUTPATIENT
Start: 2019-01-01 | End: 2019-01-01 | Stop reason: SDUPTHER

## 2019-01-01 RX ADMIN — IOPAMIDOL 65 ML: 755 INJECTION, SOLUTION INTRAVENOUS at 11:00

## 2019-01-01 RX ADMIN — DEXAMETHASONE SODIUM PHOSPHATE 4 MG: 4 INJECTION, SOLUTION INTRA-ARTICULAR; INTRALESIONAL; INTRAMUSCULAR; INTRAVENOUS; SOFT TISSUE at 16:13

## 2019-01-01 RX ADMIN — TRIAMCINOLONE ACETONIDE 40 MG: 40 INJECTION, SUSPENSION INTRA-ARTICULAR; INTRAMUSCULAR at 16:14

## 2019-01-01 ASSESSMENT — ENCOUNTER SYMPTOMS
GASTROINTESTINAL NEGATIVE: 1
EYES NEGATIVE: 1
ALLERGIC/IMMUNOLOGIC NEGATIVE: 1
WHEEZING: 1
GASTROINTESTINAL NEGATIVE: 1
RESPIRATORY NEGATIVE: 1
EYES NEGATIVE: 1
SHORTNESS OF BREATH: 1
COUGH: 1
ALLERGIC/IMMUNOLOGIC NEGATIVE: 1
GASTROINTESTINAL NEGATIVE: 1
EYES NEGATIVE: 1
RESPIRATORY NEGATIVE: 1
ALLERGIC/IMMUNOLOGIC NEGATIVE: 1

## 2019-01-01 ASSESSMENT — PATIENT HEALTH QUESTIONNAIRE - PHQ9
SUM OF ALL RESPONSES TO PHQ QUESTIONS 1-9: 2
SUM OF ALL RESPONSES TO PHQ QUESTIONS 1-9: 2

## 2019-12-17 PROBLEM — J44.1 COPD EXACERBATION (HCC): Status: ACTIVE | Noted: 2019-01-01

## 2020-01-01 ENCOUNTER — OFFICE VISIT (OUTPATIENT)
Dept: FAMILY MEDICINE CLINIC | Age: 70
End: 2020-01-01

## 2020-01-01 ENCOUNTER — APPOINTMENT (OUTPATIENT)
Dept: CT IMAGING | Age: 70
DRG: 441 | End: 2020-01-01
Payer: MEDICARE

## 2020-01-01 ENCOUNTER — APPOINTMENT (OUTPATIENT)
Dept: GENERAL RADIOLOGY | Age: 70
End: 2020-01-01
Payer: MEDICARE

## 2020-01-01 ENCOUNTER — HOSPITAL ENCOUNTER (INPATIENT)
Age: 70
LOS: 2 days | Discharge: HOSPICE | DRG: 441 | End: 2020-03-05
Attending: EMERGENCY MEDICINE | Admitting: HOSPITALIST
Payer: MEDICARE

## 2020-01-01 ENCOUNTER — HOSPITAL ENCOUNTER (OUTPATIENT)
Age: 70
Setting detail: OUTPATIENT SURGERY
Discharge: HOME | End: 2020-01-17
Attending: INTERNAL MEDICINE | Admitting: INTERNAL MEDICINE
Payer: MEDICARE

## 2020-01-01 ENCOUNTER — ANESTHESIA EVENT (OUTPATIENT)
Dept: ENDOSCOPY | Age: 70
End: 2020-01-01
Payer: MEDICARE

## 2020-01-01 ENCOUNTER — ANESTHESIA (OUTPATIENT)
Dept: ENDOSCOPY | Age: 70
End: 2020-01-01
Payer: MEDICARE

## 2020-01-01 ENCOUNTER — OFFICE VISIT (OUTPATIENT)
Dept: FAMILY MEDICINE CLINIC | Age: 70
End: 2020-01-01
Payer: MEDICARE

## 2020-01-01 ENCOUNTER — APPOINTMENT (OUTPATIENT)
Dept: GENERAL RADIOLOGY | Age: 70
DRG: 441 | End: 2020-01-01
Payer: MEDICARE

## 2020-01-01 ENCOUNTER — HOSPITAL ENCOUNTER (EMERGENCY)
Age: 70
Discharge: HOME | End: 2020-02-25
Attending: EMERGENCY MEDICINE
Payer: MEDICARE

## 2020-01-01 VITALS
DIASTOLIC BLOOD PRESSURE: 98 MMHG | SYSTOLIC BLOOD PRESSURE: 178 MMHG | HEIGHT: 61 IN | RESPIRATION RATE: 18 BRPM | TEMPERATURE: 98 F | OXYGEN SATURATION: 98 % | BODY MASS INDEX: 23.41 KG/M2 | HEART RATE: 78 BPM | WEIGHT: 124 LBS

## 2020-01-01 VITALS
WEIGHT: 114 LBS | BODY MASS INDEX: 21.54 KG/M2 | SYSTOLIC BLOOD PRESSURE: 130 MMHG | HEART RATE: 94 BPM | OXYGEN SATURATION: 96 % | TEMPERATURE: 97 F | DIASTOLIC BLOOD PRESSURE: 72 MMHG

## 2020-01-01 VITALS
TEMPERATURE: 97.6 F | HEART RATE: 111 BPM | OXYGEN SATURATION: 97 % | SYSTOLIC BLOOD PRESSURE: 122 MMHG | BODY MASS INDEX: 20.78 KG/M2 | WEIGHT: 110 LBS | DIASTOLIC BLOOD PRESSURE: 80 MMHG

## 2020-01-01 VITALS
DIASTOLIC BLOOD PRESSURE: 60 MMHG | WEIGHT: 113.5 LBS | RESPIRATION RATE: 16 BRPM | OXYGEN SATURATION: 94 % | HEART RATE: 105 BPM | BODY MASS INDEX: 21.43 KG/M2 | SYSTOLIC BLOOD PRESSURE: 82 MMHG | TEMPERATURE: 97.1 F | HEIGHT: 61 IN

## 2020-01-01 VITALS
SYSTOLIC BLOOD PRESSURE: 154 MMHG | RESPIRATION RATE: 19 BRPM | DIASTOLIC BLOOD PRESSURE: 77 MMHG | OXYGEN SATURATION: 93 %

## 2020-01-01 VITALS
WEIGHT: 114 LBS | BODY MASS INDEX: 21.52 KG/M2 | DIASTOLIC BLOOD PRESSURE: 87 MMHG | SYSTOLIC BLOOD PRESSURE: 168 MMHG | OXYGEN SATURATION: 94 % | RESPIRATION RATE: 19 BRPM | HEART RATE: 74 BPM | HEIGHT: 61 IN | TEMPERATURE: 98.1 F

## 2020-01-01 DIAGNOSIS — R41.0 CONFUSION: ICD-10-CM

## 2020-01-01 LAB
ABO/RH: NORMAL
ACETAMINOPHEN LEVEL: <15 UG/ML
ACETAMINOPHEN LEVEL: <15 UG/ML
AFB CULTURE (MYCOBACTERIA): NORMAL
AFB SMEAR: NORMAL
ALBUMIN SERPL-MCNC: 2.1 G/DL (ref 3.5–5.2)
ALBUMIN SERPL-MCNC: 2.4 G/DL (ref 3.5–5.2)
ALBUMIN SERPL-MCNC: 3 G/DL (ref 3.5–5.2)
ALBUMIN SERPL-MCNC: 3.6 G/DL (ref 3.5–5.2)
ALBUMIN SERPL-MCNC: 3.7 G/DL (ref 3.5–5.2)
ALBUMIN SERPL-MCNC: 4.8 G/DL (ref 3.5–5.2)
ALP BLD-CCNC: 155 U/L (ref 35–104)
ALP BLD-CCNC: 287 U/L (ref 35–104)
ALP BLD-CCNC: 292 U/L (ref 35–104)
ALP BLD-CCNC: 301 U/L (ref 35–104)
ALP BLD-CCNC: 351 U/L (ref 35–104)
ALP BLD-CCNC: 54 U/L (ref 35–104)
ALPHA FETOPROTEIN: 23.6 NG/ML (ref 0–8.3)
ALT SERPL-CCNC: 1209 U/L (ref 5–33)
ALT SERPL-CCNC: 1382 U/L (ref 5–33)
ALT SERPL-CCNC: 1458 U/L (ref 5–33)
ALT SERPL-CCNC: 1784 U/L (ref 5–33)
ALT SERPL-CCNC: 21 U/L (ref 5–33)
ALT SERPL-CCNC: 438 U/L (ref 5–33)
AMORPHOUS: ABNORMAL /HPF
ANION GAP SERPL CALCULATED.3IONS-SCNC: 16 MMOL/L (ref 7–19)
ANION GAP SERPL CALCULATED.3IONS-SCNC: 19 MMOL/L (ref 7–19)
ANION GAP SERPL CALCULATED.3IONS-SCNC: 21 MMOL/L (ref 7–19)
ANION GAP SERPL CALCULATED.3IONS-SCNC: 22 MMOL/L (ref 7–19)
ANION GAP SERPL CALCULATED.3IONS-SCNC: 23 MMOL/L (ref 7–19)
ANION GAP SERPL CALCULATED.3IONS-SCNC: 23 MMOL/L (ref 7–19)
ANION GAP SERPL CALCULATED.3IONS-SCNC: 24 MMOL/L (ref 7–19)
ANION GAP SERPL CALCULATED.3IONS-SCNC: 28 MMOL/L (ref 7–19)
ANISOCYTOSIS: ABNORMAL
ANTIBODY SCREEN: NORMAL
APTT: 26.6 SEC (ref 26–36.2)
AST SERPL-CCNC: 1639 U/L (ref 5–32)
AST SERPL-CCNC: 1696 U/L (ref 5–32)
AST SERPL-CCNC: 1753 U/L (ref 5–32)
AST SERPL-CCNC: 2111 U/L (ref 5–32)
AST SERPL-CCNC: 443 U/L (ref 5–32)
AST SERPL-CCNC: 47 U/L (ref 5–32)
BACTERIA: ABNORMAL /HPF
BANDED NEUTROPHILS RELATIVE PERCENT: 2 % (ref 0–5)
BASOPHILS ABSOLUTE: 0 K/UL (ref 0–0.2)
BASOPHILS RELATIVE PERCENT: 0 % (ref 0–1)
BASOPHILS RELATIVE PERCENT: 0.1 % (ref 0–1)
BASOPHILS RELATIVE PERCENT: 0.3 % (ref 0–1)
BILIRUB SERPL-MCNC: 0.7 MG/DL (ref 0.2–1.2)
BILIRUB SERPL-MCNC: 1.5 MG/DL (ref 0.2–1.2)
BILIRUB SERPL-MCNC: 5 MG/DL (ref 0.2–1.2)
BILIRUB SERPL-MCNC: 5.4 MG/DL (ref 0.2–1.2)
BILIRUB SERPL-MCNC: 5.6 MG/DL (ref 0.2–1.2)
BILIRUB SERPL-MCNC: 6 MG/DL (ref 0.2–1.2)
BILIRUBIN DIRECT: 5.3 MG/DL (ref 0–0.3)
BILIRUBIN URINE: ABNORMAL
BILIRUBIN URINE: ABNORMAL
BILIRUBIN, INDIRECT: 0.3 MG/DL (ref 0.1–1)
BLOOD BANK DISPENSE STATUS: NORMAL
BLOOD BANK PRODUCT CODE: NORMAL
BLOOD, URINE: NEGATIVE
BLOOD, URINE: NEGATIVE
BPU ID: NORMAL
BUN BLDV-MCNC: 101 MG/DL (ref 8–23)
BUN BLDV-MCNC: 104 MG/DL (ref 8–23)
BUN BLDV-MCNC: 105 MG/DL (ref 8–23)
BUN BLDV-MCNC: 105 MG/DL (ref 8–23)
BUN BLDV-MCNC: 109 MG/DL (ref 8–23)
BUN BLDV-MCNC: 110 MG/DL (ref 8–23)
BUN BLDV-MCNC: 19 MG/DL (ref 8–23)
BUN BLDV-MCNC: 43 MG/DL (ref 8–23)
CALCIUM SERPL-MCNC: 6.2 MG/DL (ref 8.8–10.2)
CALCIUM SERPL-MCNC: 7.5 MG/DL (ref 8.8–10.2)
CALCIUM SERPL-MCNC: 7.6 MG/DL (ref 8.8–10.2)
CALCIUM SERPL-MCNC: 7.8 MG/DL (ref 8.8–10.2)
CALCIUM SERPL-MCNC: 8.9 MG/DL (ref 8.8–10.2)
CALCIUM SERPL-MCNC: 8.9 MG/DL (ref 8.8–10.2)
CALCIUM SERPL-MCNC: 9.2 MG/DL (ref 8.8–10.2)
CALCIUM SERPL-MCNC: 9.7 MG/DL (ref 8.8–10.2)
CHLORIDE BLD-SCNC: 103 MMOL/L (ref 98–111)
CHLORIDE BLD-SCNC: 76 MMOL/L (ref 98–111)
CHLORIDE BLD-SCNC: 82 MMOL/L (ref 98–111)
CHLORIDE BLD-SCNC: 96 MMOL/L (ref 98–111)
CHLORIDE BLD-SCNC: 96 MMOL/L (ref 98–111)
CHLORIDE BLD-SCNC: 98 MMOL/L (ref 98–111)
CHLORIDE BLD-SCNC: 98 MMOL/L (ref 98–111)
CHLORIDE BLD-SCNC: 99 MMOL/L (ref 98–111)
CLARITY: ABNORMAL
CLARITY: ABNORMAL
CO2: 16 MMOL/L (ref 22–29)
CO2: 16 MMOL/L (ref 22–29)
CO2: 17 MMOL/L (ref 22–29)
CO2: 21 MMOL/L (ref 22–29)
CO2: 23 MMOL/L (ref 22–29)
CO2: 29 MMOL/L (ref 22–29)
COLOR: ABNORMAL
COLOR: ABNORMAL
CREAT SERPL-MCNC: 0.8 MG/DL (ref 0.5–0.9)
CREAT SERPL-MCNC: 0.9 MG/DL (ref 0.5–0.9)
CREAT SERPL-MCNC: 3.3 MG/DL (ref 0.5–0.9)
CREAT SERPL-MCNC: 3.3 MG/DL (ref 0.5–0.9)
CREAT SERPL-MCNC: 3.4 MG/DL (ref 0.5–0.9)
CREAT SERPL-MCNC: 3.5 MG/DL (ref 0.5–0.9)
CREAT SERPL-MCNC: 3.7 MG/DL (ref 0.5–0.9)
CREAT SERPL-MCNC: 3.8 MG/DL (ref 0.5–0.9)
CREATININE URINE: 46.2 MG/DL (ref 4.2–622)
CULTURE, RESPIRATORY: ABNORMAL
CULTURE, RESPIRATORY: ABNORMAL
DESCRIPTION BLOOD BANK: NORMAL
EKG P AXIS: 72 DEGREES
EKG P-R INTERVAL: 126 MS
EKG Q-T INTERVAL: 414 MS
EKG QRS DURATION: 92 MS
EKG QTC CALCULATION (BAZETT): 431 MS
EKG T AXIS: 36 DEGREES
EOSINOPHIL,URINE: NORMAL
EOSINOPHILS ABSOLUTE: 0 K/UL (ref 0–0.6)
EOSINOPHILS RELATIVE PERCENT: 0 % (ref 0–5)
EPITHELIAL CELLS, UA: ABNORMAL /HPF
EPITHELIAL CELLS, UA: ABNORMAL /HPF
F-ACTIN AB IGG: 3 UNITS (ref 0–19)
FERRITIN: >2000 NG/ML (ref 13–150)
FOLATE: 8.4 NG/ML (ref 4.8–37.3)
FUNGUS (MYCOLOGY) CULTURE: NORMAL
GFR NON-AFRICAN AMERICAN: 12
GFR NON-AFRICAN AMERICAN: 12
GFR NON-AFRICAN AMERICAN: 13
GFR NON-AFRICAN AMERICAN: 13
GFR NON-AFRICAN AMERICAN: 14
GFR NON-AFRICAN AMERICAN: 14
GFR NON-AFRICAN AMERICAN: >60
GFR NON-AFRICAN AMERICAN: >60
GLUCOSE BLD-MCNC: 112 MG/DL (ref 74–109)
GLUCOSE BLD-MCNC: 143 MG/DL (ref 74–109)
GLUCOSE BLD-MCNC: 157 MG/DL (ref 74–109)
GLUCOSE BLD-MCNC: 164 MG/DL (ref 74–109)
GLUCOSE BLD-MCNC: 165 MG/DL (ref 74–109)
GLUCOSE BLD-MCNC: 176 MG/DL (ref 74–109)
GLUCOSE BLD-MCNC: 473 MG/DL (ref 74–109)
GLUCOSE BLD-MCNC: 98 MG/DL (ref 74–109)
GLUCOSE URINE: NEGATIVE MG/DL
GLUCOSE URINE: NEGATIVE MG/DL
GRAM STAIN RESULT: ABNORMAL
HAV IGM SER IA-ACNC: NORMAL
HCT VFR BLD CALC: 24.2 % (ref 37–47)
HCT VFR BLD CALC: 31.5 % (ref 37–47)
HCT VFR BLD CALC: 35.6 % (ref 37–47)
HCT VFR BLD CALC: 38.9 % (ref 37–47)
HCT VFR BLD CALC: 40.7 % (ref 37–47)
HCT VFR BLD CALC: 42.2 % (ref 37–47)
HEMOGLOBIN: 10.4 G/DL (ref 12–16)
HEMOGLOBIN: 11.8 G/DL (ref 12–16)
HEMOGLOBIN: 12.7 G/DL (ref 12–16)
HEMOGLOBIN: 13.3 G/DL (ref 12–16)
HEMOGLOBIN: 14.6 G/DL (ref 12–16)
HEMOGLOBIN: 8.3 G/DL (ref 12–16)
HEPATITIS B CORE IGM ANTIBODY: NORMAL
HEPATITIS B SURFACE ANTIGEN INTERPRETATION: NORMAL
HEPATITIS C ANTIBODY INTERPRETATION: NORMAL
IGG: 470 MG/DL (ref 700–1600)
IGM: 18 MG/DL (ref 40–230)
IMMATURE GRANULOCYTES #: 0.2 K/UL
IMMATURE GRANULOCYTES #: 0.8 K/UL
IMMATURE GRANULOCYTES #: 0.8 K/UL
INR BLD: 1.33 (ref 0.88–1.18)
IRON SATURATION: 1375 % (ref 14–50)
IRON: 165 UG/DL (ref 37–145)
KETONES, URINE: 15 MG/DL
KETONES, URINE: ABNORMAL MG/DL
KOH PREP: NORMAL
LACTIC ACID: 4.1 MMOL/L (ref 0.5–1.9)
LEUKOCYTE ESTERASE, URINE: ABNORMAL
LEUKOCYTE ESTERASE, URINE: ABNORMAL
LIPASE: 227 U/L (ref 13–60)
LYMPHOCYTES ABSOLUTE: 0.5 K/UL (ref 1.1–4.5)
LYMPHOCYTES ABSOLUTE: 0.7 K/UL (ref 1.1–4.5)
LYMPHOCYTES ABSOLUTE: 0.9 K/UL (ref 1.1–4.5)
LYMPHOCYTES RELATIVE PERCENT: 5 % (ref 20–40)
LYMPHOCYTES RELATIVE PERCENT: 5 % (ref 20–40)
LYMPHOCYTES RELATIVE PERCENT: 5.5 % (ref 20–40)
MAGNESIUM: 2.1 MG/DL (ref 1.6–2.4)
MAGNESIUM: 2.4 MG/DL (ref 1.6–2.4)
MCH RBC QN AUTO: 32.4 PG (ref 27–31)
MCH RBC QN AUTO: 32.6 PG (ref 27–31)
MCH RBC QN AUTO: 32.7 PG (ref 27–31)
MCH RBC QN AUTO: 32.9 PG (ref 27–31)
MCH RBC QN AUTO: 33.1 PG (ref 27–31)
MCHC RBC AUTO-ENTMCNC: 32.6 G/DL (ref 33–37)
MCHC RBC AUTO-ENTMCNC: 32.7 G/DL (ref 33–37)
MCHC RBC AUTO-ENTMCNC: 33 G/DL (ref 33–37)
MCHC RBC AUTO-ENTMCNC: 33.1 G/DL (ref 33–37)
MCHC RBC AUTO-ENTMCNC: 34.6 G/DL (ref 33–37)
MCV RBC AUTO: 100 FL (ref 81–99)
MCV RBC AUTO: 100.3 FL (ref 81–99)
MCV RBC AUTO: 95 FL (ref 81–99)
MCV RBC AUTO: 98.3 FL (ref 81–99)
MCV RBC AUTO: 99.2 FL (ref 81–99)
METAMYELOCYTES RELATIVE PERCENT: 2 %
MITOCHONDRIAL M2 AB, IGG: 2.1 UNITS (ref 0–20)
MONOCYTES ABSOLUTE: 0.5 K/UL (ref 0–0.9)
MONOCYTES ABSOLUTE: 0.6 K/UL (ref 0–0.9)
MONOCYTES ABSOLUTE: 0.8 K/UL (ref 0–0.9)
MONOCYTES RELATIVE PERCENT: 5 % (ref 0–10)
MONOCYTES RELATIVE PERCENT: 6.3 % (ref 0–10)
MONOCYTES RELATIVE PERCENT: 6.4 % (ref 0–10)
MONONUCLEAR UNIDENTIFIED CELLS: 1 %
NEUTROPHILS ABSOLUTE: 10 K/UL (ref 1.5–7.5)
NEUTROPHILS ABSOLUTE: 8.1 K/UL (ref 1.5–7.5)
NEUTROPHILS ABSOLUTE: 8.6 K/UL (ref 1.5–7.5)
NEUTROPHILS RELATIVE PERCENT: 81 % (ref 50–65)
NEUTROPHILS RELATIVE PERCENT: 81.1 % (ref 50–65)
NEUTROPHILS RELATIVE PERCENT: 86.8 % (ref 50–65)
NITRITE, URINE: NEGATIVE
NITRITE, URINE: POSITIVE
ORGANISM: ABNORMAL
PARATHYROID HORMONE INTACT: 781.3 PG/ML (ref 15–65)
PDW BLD-RTO: 12.3 % (ref 11.5–14.5)
PDW BLD-RTO: 13.9 % (ref 11.5–14.5)
PDW BLD-RTO: 15.9 % (ref 11.5–14.5)
PDW BLD-RTO: 16 % (ref 11.5–14.5)
PDW BLD-RTO: 16.2 % (ref 11.5–14.5)
PH UA: 5 (ref 5–8)
PH UA: 6 (ref 5–8)
PHOSPHORUS: 3.4 MG/DL (ref 2.5–4.5)
PLATELET # BLD: 105 K/UL (ref 130–400)
PLATELET # BLD: 247 K/UL (ref 130–400)
PLATELET # BLD: 25 K/UL (ref 130–400)
PLATELET # BLD: 31 K/UL (ref 130–400)
PLATELET # BLD: 39 K/UL (ref 130–400)
PLATELET SLIDE REVIEW: ABNORMAL
PLATELET SLIDE REVIEW: ABNORMAL
PMV BLD AUTO: 10.7 FL (ref 9.4–12.3)
PMV BLD AUTO: 11.9 FL (ref 9.4–12.3)
PMV BLD AUTO: 12.3 FL (ref 9.4–12.3)
PMV BLD AUTO: 12.6 FL (ref 9.4–12.3)
PMV BLD AUTO: 8.6 FL (ref 9.4–12.3)
POTASSIUM REFLEX MAGNESIUM: 3.2 MMOL/L (ref 3.5–5)
POTASSIUM REFLEX MAGNESIUM: 4.3 MMOL/L (ref 3.5–5)
POTASSIUM REFLEX MAGNESIUM: 4.3 MMOL/L (ref 3.5–5)
POTASSIUM REFLEX MAGNESIUM: 4.7 MMOL/L (ref 3.5–5)
POTASSIUM REFLEX MAGNESIUM: 7.4 MMOL/L (ref 3.5–5)
POTASSIUM SERPL-SCNC: 3.9 MMOL/L (ref 3.5–5)
POTASSIUM SERPL-SCNC: 3.9 MMOL/L (ref 3.5–5)
POTASSIUM SERPL-SCNC: 6.3 MMOL/L (ref 3.5–5)
POTASSIUM SERPL-SCNC: 7 MMOL/L (ref 3.5–5)
PRO-BNP: 525 PG/ML (ref 0–900)
PROTEIN PROTEIN: 103 MG/DL (ref 15–45)
PROTEIN UA: 100 MG/DL
PROTEIN UA: 100 MG/DL
PROTHROMBIN TIME: 16.5 SEC (ref 12–14.6)
RBC # BLD: 3.14 M/UL (ref 4.2–5.4)
RBC # BLD: 3.62 M/UL (ref 4.2–5.4)
RBC # BLD: 3.92 M/UL (ref 4.2–5.4)
RBC # BLD: 4.07 M/UL (ref 4.2–5.4)
RBC # BLD: 4.44 M/UL (ref 4.2–5.4)
RBC UA: ABNORMAL /HPF (ref 0–2)
RBC UA: ABNORMAL /HPF (ref 0–2)
SODIUM BLD-SCNC: 122 MMOL/L (ref 136–145)
SODIUM BLD-SCNC: 131 MMOL/L (ref 136–145)
SODIUM BLD-SCNC: 136 MMOL/L (ref 136–145)
SODIUM BLD-SCNC: 137 MMOL/L (ref 136–145)
SODIUM BLD-SCNC: 139 MMOL/L (ref 136–145)
SODIUM BLD-SCNC: 141 MMOL/L (ref 136–145)
SODIUM URINE: 35 MMOL/L
SPECIFIC GRAVITY UA: 1.02 (ref 1–1.03)
SPECIFIC GRAVITY UA: 1.02 (ref 1–1.03)
TOTAL IRON BINDING CAPACITY: 12 UG/DL (ref 250–400)
TOTAL PROTEIN: 3.9 G/DL (ref 6.6–8.7)
TOTAL PROTEIN: 4.6 G/DL (ref 6.6–8.7)
TOTAL PROTEIN: 4.7 G/DL (ref 6.6–8.7)
TOTAL PROTEIN: 5.7 G/DL (ref 6.6–8.7)
TOTAL PROTEIN: 6.3 G/DL (ref 6.6–8.7)
TOTAL PROTEIN: 7.7 G/DL (ref 6.6–8.7)
TROPONIN: 0.04 NG/ML (ref 0–0.03)
UREA NITROGEN, UR: 383 MG/DL
URIC ACID, SERUM: 11.3 MG/DL (ref 2.4–5.7)
URINE CULTURE, ROUTINE: NORMAL
URINE CULTURE, ROUTINE: NORMAL
URINE REFLEX TO CULTURE: YES
URINE REFLEX TO CULTURE: YES
UROBILINOGEN, URINE: 1 E.U./DL
UROBILINOGEN, URINE: 1 E.U./DL
VITAMIN B-12: >2000 PG/ML (ref 211–946)
VITAMIN D 25-HYDROXY: 25 NG/ML
WBC # BLD: 10.1 K/UL (ref 4.8–10.8)
WBC # BLD: 10.2 K/UL (ref 4.8–10.8)
WBC # BLD: 12.3 K/UL (ref 4.8–10.8)
WBC # BLD: 9.2 K/UL (ref 4.8–10.8)
WBC # BLD: 9.3 K/UL (ref 4.8–10.8)
WBC UA: ABNORMAL /HPF (ref 0–5)
WBC UA: ABNORMAL /HPF (ref 0–5)

## 2020-01-01 PROCEDURE — 6370000000 HC RX 637 (ALT 250 FOR IP): Performed by: HOSPITALIST

## 2020-01-01 PROCEDURE — 51702 INSERT TEMP BLADDER CATH: CPT

## 2020-01-01 PROCEDURE — 83735 ASSAY OF MAGNESIUM: CPT

## 2020-01-01 PROCEDURE — C9113 INJ PANTOPRAZOLE SODIUM, VIA: HCPCS | Performed by: HOSPITALIST

## 2020-01-01 PROCEDURE — 80074 ACUTE HEPATITIS PANEL: CPT

## 2020-01-01 PROCEDURE — 3609027000 HC BRONCHOSCOPY: Performed by: INTERNAL MEDICINE

## 2020-01-01 PROCEDURE — G8420 CALC BMI NORM PARAMETERS: HCPCS | Performed by: FAMILY MEDICINE

## 2020-01-01 PROCEDURE — 84540 ASSAY OF URINE/UREA-N: CPT

## 2020-01-01 PROCEDURE — 83690 ASSAY OF LIPASE: CPT

## 2020-01-01 PROCEDURE — 82570 ASSAY OF URINE CREATININE: CPT

## 2020-01-01 PROCEDURE — 4040F PNEUMOC VAC/ADMIN/RCVD: CPT | Performed by: FAMILY MEDICINE

## 2020-01-01 PROCEDURE — 92522 EVALUATE SPEECH PRODUCTION: CPT

## 2020-01-01 PROCEDURE — 96372 THER/PROPH/DIAG INJ SC/IM: CPT | Performed by: FAMILY MEDICINE

## 2020-01-01 PROCEDURE — 2580000003 HC RX 258: Performed by: INTERNAL MEDICINE

## 2020-01-01 PROCEDURE — 83605 ASSAY OF LACTIC ACID: CPT

## 2020-01-01 PROCEDURE — 1090F PRES/ABSN URINE INCON ASSESS: CPT | Performed by: FAMILY MEDICINE

## 2020-01-01 PROCEDURE — 6360000002 HC RX W HCPCS: Performed by: INTERNAL MEDICINE

## 2020-01-01 PROCEDURE — 2709999900 HC NON-CHARGEABLE SUPPLY: Performed by: INTERNAL MEDICINE

## 2020-01-01 PROCEDURE — 80053 COMPREHEN METABOLIC PANEL: CPT

## 2020-01-01 PROCEDURE — 85018 HEMOGLOBIN: CPT

## 2020-01-01 PROCEDURE — 92610 EVALUATE SWALLOWING FUNCTION: CPT

## 2020-01-01 PROCEDURE — 6360000002 HC RX W HCPCS

## 2020-01-01 PROCEDURE — 2580000003 HC RX 258: Performed by: EMERGENCY MEDICINE

## 2020-01-01 PROCEDURE — 71045 X-RAY EXAM CHEST 1 VIEW: CPT

## 2020-01-01 PROCEDURE — G8427 DOCREV CUR MEDS BY ELIG CLIN: HCPCS | Performed by: FAMILY MEDICINE

## 2020-01-01 PROCEDURE — 6370000000 HC RX 637 (ALT 250 FOR IP): Performed by: EMERGENCY MEDICINE

## 2020-01-01 PROCEDURE — 3700000000 HC ANESTHESIA ATTENDED CARE: Performed by: INTERNAL MEDICINE

## 2020-01-01 PROCEDURE — 6360000002 HC RX W HCPCS: Performed by: HOSPITALIST

## 2020-01-01 PROCEDURE — 81001 URINALYSIS AUTO W/SCOPE: CPT

## 2020-01-01 PROCEDURE — 2500000003 HC RX 250 WO HCPCS: Performed by: INTERNAL MEDICINE

## 2020-01-01 PROCEDURE — 2700000000 HC OXYGEN THERAPY PER DAY

## 2020-01-01 PROCEDURE — 36415 COLL VENOUS BLD VENIPUNCTURE: CPT

## 2020-01-01 PROCEDURE — G0480 DRUG TEST DEF 1-7 CLASSES: HCPCS

## 2020-01-01 PROCEDURE — 87015 SPECIMEN INFECT AGNT CONCNTJ: CPT

## 2020-01-01 PROCEDURE — 93005 ELECTROCARDIOGRAM TRACING: CPT | Performed by: EMERGENCY MEDICINE

## 2020-01-01 PROCEDURE — 6360000002 HC RX W HCPCS: Performed by: EMERGENCY MEDICINE

## 2020-01-01 PROCEDURE — 99223 1ST HOSP IP/OBS HIGH 75: CPT | Performed by: NURSE PRACTITIONER

## 2020-01-01 PROCEDURE — 82607 VITAMIN B-12: CPT

## 2020-01-01 PROCEDURE — 99223 1ST HOSP IP/OBS HIGH 75: CPT | Performed by: INTERNAL MEDICINE

## 2020-01-01 PROCEDURE — 82728 ASSAY OF FERRITIN: CPT

## 2020-01-01 PROCEDURE — 82784 ASSAY IGA/IGD/IGG/IGM EACH: CPT

## 2020-01-01 PROCEDURE — 96375 TX/PRO/DX INJ NEW DRUG ADDON: CPT

## 2020-01-01 PROCEDURE — 2500000003 HC RX 250 WO HCPCS: Performed by: HOSPITALIST

## 2020-01-01 PROCEDURE — 84484 ASSAY OF TROPONIN QUANT: CPT

## 2020-01-01 PROCEDURE — 85025 COMPLETE CBC W/AUTO DIFF WBC: CPT

## 2020-01-01 PROCEDURE — 2500000003 HC RX 250 WO HCPCS

## 2020-01-01 PROCEDURE — 85027 COMPLETE CBC AUTOMATED: CPT

## 2020-01-01 PROCEDURE — 6370000000 HC RX 637 (ALT 250 FOR IP): Performed by: INTERNAL MEDICINE

## 2020-01-01 PROCEDURE — 94640 AIRWAY INHALATION TREATMENT: CPT

## 2020-01-01 PROCEDURE — 99233 SBSQ HOSP IP/OBS HIGH 50: CPT | Performed by: INTERNAL MEDICINE

## 2020-01-01 PROCEDURE — 7100000011 HC PHASE II RECOVERY - ADDTL 15 MIN: Performed by: INTERNAL MEDICINE

## 2020-01-01 PROCEDURE — 84156 ASSAY OF PROTEIN URINE: CPT

## 2020-01-01 PROCEDURE — 93010 ELECTROCARDIOGRAM REPORT: CPT | Performed by: INTERNAL MEDICINE

## 2020-01-01 PROCEDURE — 84550 ASSAY OF BLOOD/URIC ACID: CPT

## 2020-01-01 PROCEDURE — 86900 BLOOD TYPING SEROLOGIC ABO: CPT

## 2020-01-01 PROCEDURE — 83880 ASSAY OF NATRIURETIC PEPTIDE: CPT

## 2020-01-01 PROCEDURE — 83550 IRON BINDING TEST: CPT

## 2020-01-01 PROCEDURE — 99356 PR PROLONGED SVC I/P OR OBS SETTING 1ST HOUR: CPT | Performed by: NURSE PRACTITIONER

## 2020-01-01 PROCEDURE — 74176 CT ABD & PELVIS W/O CONTRAST: CPT

## 2020-01-01 PROCEDURE — 88305 TISSUE EXAM BY PATHOLOGIST: CPT

## 2020-01-01 PROCEDURE — 1123F ACP DISCUSS/DSCN MKR DOCD: CPT | Performed by: FAMILY MEDICINE

## 2020-01-01 PROCEDURE — 7100000010 HC PHASE II RECOVERY - FIRST 15 MIN: Performed by: INTERNAL MEDICINE

## 2020-01-01 PROCEDURE — G8399 PT W/DXA RESULTS DOCUMENT: HCPCS | Performed by: FAMILY MEDICINE

## 2020-01-01 PROCEDURE — 83540 ASSAY OF IRON: CPT

## 2020-01-01 PROCEDURE — 87186 SC STD MICRODIL/AGAR DIL: CPT

## 2020-01-01 PROCEDURE — 87102 FUNGUS ISOLATION CULTURE: CPT

## 2020-01-01 PROCEDURE — 1210000000 HC MED SURG R&B

## 2020-01-01 PROCEDURE — 96374 THER/PROPH/DIAG INJ IV PUSH: CPT

## 2020-01-01 PROCEDURE — 87086 URINE CULTURE/COLONY COUNT: CPT

## 2020-01-01 PROCEDURE — 87205 SMEAR GRAM STAIN: CPT

## 2020-01-01 PROCEDURE — 86901 BLOOD TYPING SEROLOGIC RH(D): CPT

## 2020-01-01 PROCEDURE — 4004F PT TOBACCO SCREEN RCVD TLK: CPT | Performed by: FAMILY MEDICINE

## 2020-01-01 PROCEDURE — 86850 RBC ANTIBODY SCREEN: CPT

## 2020-01-01 PROCEDURE — 99214 OFFICE O/P EST MOD 30 MIN: CPT | Performed by: FAMILY MEDICINE

## 2020-01-01 PROCEDURE — 82105 ALPHA-FETOPROTEIN SERUM: CPT

## 2020-01-01 PROCEDURE — 87116 MYCOBACTERIA CULTURE: CPT

## 2020-01-01 PROCEDURE — 85610 PROTHROMBIN TIME: CPT

## 2020-01-01 PROCEDURE — 84100 ASSAY OF PHOSPHORUS: CPT

## 2020-01-01 PROCEDURE — 88112 CYTOPATH CELL ENHANCE TECH: CPT

## 2020-01-01 PROCEDURE — 3017F COLORECTAL CA SCREEN DOC REV: CPT | Performed by: FAMILY MEDICINE

## 2020-01-01 PROCEDURE — 84132 ASSAY OF SERUM POTASSIUM: CPT

## 2020-01-01 PROCEDURE — 2580000003 HC RX 258: Performed by: HOSPITALIST

## 2020-01-01 PROCEDURE — 99291 CRITICAL CARE FIRST HOUR: CPT

## 2020-01-01 PROCEDURE — 83516 IMMUNOASSAY NONANTIBODY: CPT

## 2020-01-01 PROCEDURE — 82746 ASSAY OF FOLIC ACID SERUM: CPT

## 2020-01-01 PROCEDURE — 85014 HEMATOCRIT: CPT

## 2020-01-01 PROCEDURE — 87206 SMEAR FLUORESCENT/ACID STAI: CPT

## 2020-01-01 PROCEDURE — 84300 ASSAY OF URINE SODIUM: CPT

## 2020-01-01 PROCEDURE — 2000000000 HC ICU R&B

## 2020-01-01 PROCEDURE — C9113 INJ PANTOPRAZOLE SODIUM, VIA: HCPCS | Performed by: INTERNAL MEDICINE

## 2020-01-01 PROCEDURE — 87070 CULTURE OTHR SPECIMN AEROBIC: CPT

## 2020-01-01 PROCEDURE — 99233 SBSQ HOSP IP/OBS HIGH 50: CPT | Performed by: NURSE PRACTITIONER

## 2020-01-01 PROCEDURE — 85730 THROMBOPLASTIN TIME PARTIAL: CPT

## 2020-01-01 PROCEDURE — 87040 BLOOD CULTURE FOR BACTERIA: CPT

## 2020-01-01 PROCEDURE — 82306 VITAMIN D 25 HYDROXY: CPT

## 2020-01-01 PROCEDURE — 93005 ELECTROCARDIOGRAM TRACING: CPT

## 2020-01-01 PROCEDURE — 3700000001 HC ADD 15 MINUTES (ANESTHESIA): Performed by: INTERNAL MEDICINE

## 2020-01-01 PROCEDURE — 99284 EMERGENCY DEPT VISIT MOD MDM: CPT

## 2020-01-01 PROCEDURE — 83970 ASSAY OF PARATHORMONE: CPT

## 2020-01-01 PROCEDURE — G8484 FLU IMMUNIZE NO ADMIN: HCPCS | Performed by: FAMILY MEDICINE

## 2020-01-01 RX ORDER — SODIUM CHLORIDE, SODIUM LACTATE, POTASSIUM CHLORIDE, CALCIUM CHLORIDE 600; 310; 30; 20 MG/100ML; MG/100ML; MG/100ML; MG/100ML
INJECTION, SOLUTION INTRAVENOUS CONTINUOUS
Status: DISCONTINUED | OUTPATIENT
Start: 2020-01-01 | End: 2020-01-01 | Stop reason: HOSPADM

## 2020-01-01 RX ORDER — ONDANSETRON 2 MG/ML
4 INJECTION INTRAMUSCULAR; INTRAVENOUS EVERY 6 HOURS PRN
Status: CANCELLED | OUTPATIENT
Start: 2020-01-01

## 2020-01-01 RX ORDER — PROMETHAZINE HYDROCHLORIDE 25 MG/1
12.5 TABLET ORAL EVERY 6 HOURS PRN
Status: CANCELLED | OUTPATIENT
Start: 2020-01-01

## 2020-01-01 RX ORDER — 0.9 % SODIUM CHLORIDE 0.9 %
20 INTRAVENOUS SOLUTION INTRAVENOUS ONCE
Status: DISCONTINUED | OUTPATIENT
Start: 2020-01-01 | End: 2020-01-01 | Stop reason: HOSPADM

## 2020-01-01 RX ORDER — POTASSIUM CHLORIDE 20 MEQ/1
40 TABLET, EXTENDED RELEASE ORAL PRN
Status: CANCELLED | OUTPATIENT
Start: 2020-01-01

## 2020-01-01 RX ORDER — POTASSIUM CHLORIDE 20 MEQ/1
40 TABLET, EXTENDED RELEASE ORAL PRN
Status: DISCONTINUED | OUTPATIENT
Start: 2020-01-01 | End: 2020-01-01 | Stop reason: HOSPADM

## 2020-01-01 RX ORDER — SODIUM CHLORIDE 0.9 % (FLUSH) 0.9 %
10 SYRINGE (ML) INJECTION EVERY 12 HOURS SCHEDULED
Status: DISCONTINUED | OUTPATIENT
Start: 2020-01-01 | End: 2020-01-01 | Stop reason: HOSPADM

## 2020-01-01 RX ORDER — 0.9 % SODIUM CHLORIDE 0.9 %
1000 INTRAVENOUS SOLUTION INTRAVENOUS ONCE
Status: COMPLETED | OUTPATIENT
Start: 2020-01-01 | End: 2020-01-01

## 2020-01-01 RX ORDER — DEXTROSE MONOHYDRATE 25 G/50ML
25 INJECTION, SOLUTION INTRAVENOUS PRN
Status: CANCELLED | OUTPATIENT
Start: 2020-01-01

## 2020-01-01 RX ORDER — LIDOCAINE HYDROCHLORIDE 10 MG/ML
INJECTION, SOLUTION EPIDURAL; INFILTRATION; INTRACAUDAL; PERINEURAL PRN
Status: DISCONTINUED | OUTPATIENT
Start: 2020-01-01 | End: 2020-01-01 | Stop reason: SDUPTHER

## 2020-01-01 RX ORDER — SODIUM CHLORIDE 9 MG/ML
10 INJECTION INTRAVENOUS 2 TIMES DAILY
Status: DISCONTINUED | OUTPATIENT
Start: 2020-01-01 | End: 2020-01-01 | Stop reason: HOSPADM

## 2020-01-01 RX ORDER — LEVOFLOXACIN 500 MG/1
500 TABLET, FILM COATED ORAL DAILY
COMMUNITY
Start: 2020-01-01 | End: 2020-03-09

## 2020-01-01 RX ORDER — OYSTER SHELL CALCIUM WITH VITAMIN D 500; 200 MG/1; [IU]/1
1 TABLET, FILM COATED ORAL DAILY
Status: DISCONTINUED | OUTPATIENT
Start: 2020-01-01 | End: 2020-01-01

## 2020-01-01 RX ORDER — ONDANSETRON 2 MG/ML
4 INJECTION INTRAMUSCULAR; INTRAVENOUS EVERY 6 HOURS PRN
Status: DISCONTINUED | OUTPATIENT
Start: 2020-01-01 | End: 2020-01-01 | Stop reason: HOSPADM

## 2020-01-01 RX ORDER — PROPOFOL 10 MG/ML
INJECTION, EMULSION INTRAVENOUS PRN
Status: DISCONTINUED | OUTPATIENT
Start: 2020-01-01 | End: 2020-01-01 | Stop reason: SDUPTHER

## 2020-01-01 RX ORDER — CALCIUM GLUCONATE 94 MG/ML
1 INJECTION, SOLUTION INTRAVENOUS ONCE
Status: DISCONTINUED | OUTPATIENT
Start: 2020-01-01 | End: 2020-01-01 | Stop reason: HOSPADM

## 2020-01-01 RX ORDER — HYDROCODONE BITARTRATE AND ACETAMINOPHEN 5; 325 MG/1; MG/1
1 TABLET ORAL EVERY 6 HOURS PRN
Status: DISCONTINUED | OUTPATIENT
Start: 2020-01-01 | End: 2020-01-01

## 2020-01-01 RX ORDER — MORPHINE SULFATE 4 MG/ML
1 INJECTION, SOLUTION INTRAMUSCULAR; INTRAVENOUS EVERY 4 HOURS PRN
Status: CANCELLED | OUTPATIENT
Start: 2020-01-01

## 2020-01-01 RX ORDER — HYDRALAZINE HYDROCHLORIDE 20 MG/ML
10 INJECTION INTRAMUSCULAR; INTRAVENOUS ONCE
Status: COMPLETED | OUTPATIENT
Start: 2020-01-01 | End: 2020-01-01

## 2020-01-01 RX ORDER — MAGNESIUM SULFATE IN WATER 40 MG/ML
2 INJECTION, SOLUTION INTRAVENOUS PRN
Status: DISCONTINUED | OUTPATIENT
Start: 2020-01-01 | End: 2020-01-01 | Stop reason: HOSPADM

## 2020-01-01 RX ORDER — POTASSIUM CHLORIDE 7.45 MG/ML
10 INJECTION INTRAVENOUS PRN
Status: DISCONTINUED | OUTPATIENT
Start: 2020-01-01 | End: 2020-01-01 | Stop reason: HOSPADM

## 2020-01-01 RX ORDER — SODIUM CHLORIDE 0.9 % (FLUSH) 0.9 %
10 SYRINGE (ML) INJECTION PRN
Status: DISCONTINUED | OUTPATIENT
Start: 2020-01-01 | End: 2020-01-01 | Stop reason: HOSPADM

## 2020-01-01 RX ORDER — SODIUM CHLORIDE, SODIUM LACTATE, POTASSIUM CHLORIDE, AND CALCIUM CHLORIDE .6; .31; .03; .02 G/100ML; G/100ML; G/100ML; G/100ML
500 INJECTION, SOLUTION INTRAVENOUS ONCE
Status: COMPLETED | OUTPATIENT
Start: 2020-01-01 | End: 2020-01-01

## 2020-01-01 RX ORDER — SODIUM CHLORIDE 9 MG/ML
10 INJECTION INTRAVENOUS 2 TIMES DAILY
Status: CANCELLED | OUTPATIENT
Start: 2020-01-01

## 2020-01-01 RX ORDER — IBUPROFEN 800 MG/1
800 TABLET ORAL EVERY 6 HOURS PRN
COMMUNITY

## 2020-01-01 RX ORDER — ONDANSETRON 2 MG/ML
4 INJECTION INTRAMUSCULAR; INTRAVENOUS ONCE
Status: COMPLETED | OUTPATIENT
Start: 2020-01-01 | End: 2020-01-01

## 2020-01-01 RX ORDER — PROMETHAZINE HYDROCHLORIDE 12.5 MG/1
12.5 TABLET ORAL EVERY 6 HOURS PRN
COMMUNITY

## 2020-01-01 RX ORDER — SODIUM POLYSTYRENE SULFONATE 15 G/60ML
30 SUSPENSION ORAL; RECTAL EVERY 6 HOURS
Status: DISCONTINUED | OUTPATIENT
Start: 2020-01-01 | End: 2020-01-01

## 2020-01-01 RX ORDER — PANTOPRAZOLE SODIUM 40 MG/10ML
40 INJECTION, POWDER, LYOPHILIZED, FOR SOLUTION INTRAVENOUS 2 TIMES DAILY
Status: CANCELLED | OUTPATIENT
Start: 2020-01-01

## 2020-01-01 RX ORDER — 0.9 % SODIUM CHLORIDE 0.9 %
1000 INTRAVENOUS SOLUTION INTRAVENOUS ONCE
Status: DISCONTINUED | OUTPATIENT
Start: 2020-01-01 | End: 2020-01-01

## 2020-01-01 RX ORDER — ONDANSETRON 2 MG/ML
INJECTION INTRAMUSCULAR; INTRAVENOUS
Status: COMPLETED
Start: 2020-01-01 | End: 2020-01-01

## 2020-01-01 RX ORDER — MAGNESIUM SULFATE IN WATER 40 MG/ML
2 INJECTION, SOLUTION INTRAVENOUS PRN
Status: CANCELLED | OUTPATIENT
Start: 2020-01-01

## 2020-01-01 RX ORDER — POTASSIUM CHLORIDE 7.45 MG/ML
10 INJECTION INTRAVENOUS PRN
Status: CANCELLED | OUTPATIENT
Start: 2020-01-01

## 2020-01-01 RX ORDER — PROMETHAZINE HYDROCHLORIDE 12.5 MG/1
12.5 TABLET ORAL EVERY 6 HOURS PRN
Status: DISCONTINUED | OUTPATIENT
Start: 2020-01-01 | End: 2020-01-01 | Stop reason: HOSPADM

## 2020-01-01 RX ORDER — CALCIUM GLUCONATE 94 MG/ML
1 INJECTION, SOLUTION INTRAVENOUS ONCE
Status: COMPLETED | OUTPATIENT
Start: 2020-01-01 | End: 2020-01-01

## 2020-01-01 RX ORDER — POTASSIUM CHLORIDE 20 MEQ/1
20 TABLET, EXTENDED RELEASE ORAL 2 TIMES DAILY
COMMUNITY

## 2020-01-01 RX ORDER — MORPHINE SULFATE 4 MG/ML
1 INJECTION, SOLUTION INTRAMUSCULAR; INTRAVENOUS EVERY 4 HOURS PRN
Status: DISCONTINUED | OUTPATIENT
Start: 2020-01-01 | End: 2020-01-01 | Stop reason: HOSPADM

## 2020-01-01 RX ORDER — DEXTROSE MONOHYDRATE 25 G/50ML
25 INJECTION, SOLUTION INTRAVENOUS PRN
Status: DISCONTINUED | OUTPATIENT
Start: 2020-01-01 | End: 2020-01-01 | Stop reason: HOSPADM

## 2020-01-01 RX ORDER — ERGOCALCIFEROL 1.25 MG/1
50000 CAPSULE ORAL WEEKLY
Status: DISCONTINUED | OUTPATIENT
Start: 2020-01-01 | End: 2020-01-01 | Stop reason: HOSPADM

## 2020-01-01 RX ORDER — PANTOPRAZOLE SODIUM 40 MG/10ML
40 INJECTION, POWDER, LYOPHILIZED, FOR SOLUTION INTRAVENOUS 2 TIMES DAILY
Status: DISCONTINUED | OUTPATIENT
Start: 2020-01-01 | End: 2020-01-01 | Stop reason: HOSPADM

## 2020-01-01 RX ORDER — ACETAMINOPHEN 325 MG/1
650 TABLET ORAL EVERY 6 HOURS PRN
Status: DISCONTINUED | OUTPATIENT
Start: 2020-01-01 | End: 2020-01-01

## 2020-01-01 RX ORDER — SODIUM CHLORIDE 0.9 % (FLUSH) 0.9 %
10 SYRINGE (ML) INJECTION PRN
Status: CANCELLED | OUTPATIENT
Start: 2020-01-01

## 2020-01-01 RX ORDER — ACETAMINOPHEN 650 MG/1
650 SUPPOSITORY RECTAL EVERY 6 HOURS PRN
Status: DISCONTINUED | OUTPATIENT
Start: 2020-01-01 | End: 2020-01-01

## 2020-01-01 RX ORDER — HYDROCODONE BITARTRATE AND ACETAMINOPHEN 5; 325 MG/1; MG/1
1 TABLET ORAL EVERY 6 HOURS PRN
Qty: 120 TABLET | Refills: 0 | Status: SHIPPED | OUTPATIENT
Start: 2020-01-01 | End: 2020-04-01

## 2020-01-01 RX ORDER — SODIUM POLYSTYRENE SULFONATE 15 G/60ML
30 SUSPENSION ORAL; RECTAL ONCE
Status: COMPLETED | OUTPATIENT
Start: 2020-01-01 | End: 2020-01-01

## 2020-01-01 RX ORDER — SODIUM CHLORIDE 0.9 % (FLUSH) 0.9 %
10 SYRINGE (ML) INJECTION EVERY 12 HOURS SCHEDULED
Status: CANCELLED | OUTPATIENT
Start: 2020-01-01

## 2020-01-01 RX ADMIN — MORPHINE SULFATE 1 MG: 4 INJECTION INTRAVENOUS at 20:45

## 2020-01-01 RX ADMIN — INSULIN HUMAN 10 UNITS: 100 INJECTION, SOLUTION PARENTERAL at 06:10

## 2020-01-01 RX ADMIN — ACETYLCYSTEINE 4940 MG: 200 INJECTION, SOLUTION INTRAVENOUS at 10:32

## 2020-01-01 RX ADMIN — SODIUM CHLORIDE 1000 ML: 9 INJECTION, SOLUTION INTRAVENOUS at 06:09

## 2020-01-01 RX ADMIN — ONDANSETRON 4 MG: 2 INJECTION INTRAMUSCULAR; INTRAVENOUS at 04:30

## 2020-01-01 RX ADMIN — DEXTROSE 50 % IN WATER (D50W) INTRAVENOUS SYRINGE 25 G: at 06:10

## 2020-01-01 RX ADMIN — MEROPENEM 500 MG: 500 INJECTION, POWDER, FOR SOLUTION INTRAVENOUS at 22:00

## 2020-01-01 RX ADMIN — Medication: at 01:06

## 2020-01-01 RX ADMIN — PANTOPRAZOLE SODIUM 40 MG: 40 INJECTION, POWDER, FOR SOLUTION INTRAVENOUS at 21:00

## 2020-01-01 RX ADMIN — Medication: at 10:31

## 2020-01-01 RX ADMIN — SODIUM CHLORIDE, PRESERVATIVE FREE 10 ML: 5 INJECTION INTRAVENOUS at 21:00

## 2020-01-01 RX ADMIN — ALBUTEROL SULFATE 10 MG: 2.5 SOLUTION RESPIRATORY (INHALATION) at 09:13

## 2020-01-01 RX ADMIN — CALCIUM GLUCONATE 1 G: 98 INJECTION, SOLUTION INTRAVENOUS at 06:10

## 2020-01-01 RX ADMIN — PROPOFOL 150 MG: 10 INJECTION, EMULSION INTRAVENOUS at 07:27

## 2020-01-01 RX ADMIN — ACETYLCYSTEINE 4940 MG: 6 INJECTION, SOLUTION INTRAVENOUS at 17:00

## 2020-01-01 RX ADMIN — LIDOCAINE HYDROCHLORIDE 50 MG: 10 INJECTION, SOLUTION EPIDURAL; INFILTRATION; INTRACAUDAL; PERINEURAL at 07:27

## 2020-01-01 RX ADMIN — SODIUM POLYSTYRENE SULFONATE 30 G: 15 SUSPENSION ORAL; RECTAL at 04:06

## 2020-01-01 RX ADMIN — SODIUM BICARBONATE: 84 INJECTION, SOLUTION INTRAVENOUS at 17:30

## 2020-01-01 RX ADMIN — ACETYLCYSTEINE 2480 MG: 200 INJECTION, SOLUTION INTRAVENOUS at 12:48

## 2020-01-01 RX ADMIN — ONDANSETRON 4 MG: 2 INJECTION INTRAMUSCULAR; INTRAVENOUS at 21:12

## 2020-01-01 RX ADMIN — MORPHINE SULFATE 1 MG: 4 INJECTION INTRAVENOUS at 14:41

## 2020-01-01 RX ADMIN — SODIUM POLYSTYRENE SULFONATE 30 G: 15 SUSPENSION ORAL; RECTAL at 15:30

## 2020-01-01 RX ADMIN — SODIUM BICARBONATE: 84 INJECTION, SOLUTION INTRAVENOUS at 09:52

## 2020-01-01 RX ADMIN — MORPHINE SULFATE 1 MG: 4 INJECTION INTRAVENOUS at 12:59

## 2020-01-01 RX ADMIN — PANTOPRAZOLE SODIUM 40 MG: 40 INJECTION, POWDER, FOR SOLUTION INTRAVENOUS at 08:27

## 2020-01-01 RX ADMIN — SODIUM CHLORIDE 1000 ML: 9 INJECTION, SOLUTION INTRAVENOUS at 11:30

## 2020-01-01 RX ADMIN — SODIUM CHLORIDE, POTASSIUM CHLORIDE, SODIUM LACTATE AND CALCIUM CHLORIDE 500 ML: 600; 310; 30; 20 INJECTION, SOLUTION INTRAVENOUS at 04:45

## 2020-01-01 RX ADMIN — SODIUM CHLORIDE, PRESERVATIVE FREE 10 ML: 5 INJECTION INTRAVENOUS at 20:45

## 2020-01-01 RX ADMIN — PANTOPRAZOLE SODIUM 40 MG: 40 INJECTION, POWDER, FOR SOLUTION INTRAVENOUS at 20:44

## 2020-01-01 RX ADMIN — SODIUM CHLORIDE 1000 ML: 9 INJECTION, SOLUTION INTRAVENOUS at 16:33

## 2020-01-01 RX ADMIN — SODIUM CHLORIDE, PRESERVATIVE FREE 10 ML: 5 INJECTION INTRAVENOUS at 08:28

## 2020-01-01 RX ADMIN — SODIUM CHLORIDE, PRESERVATIVE FREE 10 ML: 5 INJECTION INTRAVENOUS at 08:27

## 2020-01-01 RX ADMIN — PANTOPRAZOLE SODIUM 40 MG: 40 INJECTION, POWDER, FOR SOLUTION INTRAVENOUS at 09:51

## 2020-01-01 RX ADMIN — ONDANSETRON 4 MG: 2 INJECTION INTRAMUSCULAR; INTRAVENOUS at 20:54

## 2020-01-01 RX ADMIN — ACETYLCYSTEINE 4940 MG: 200 INJECTION, SOLUTION INTRAVENOUS at 03:56

## 2020-01-01 RX ADMIN — MEROPENEM 500 MG: 500 INJECTION, POWDER, FOR SOLUTION INTRAVENOUS at 10:45

## 2020-01-01 RX ADMIN — ACETYLCYSTEINE 7420 MG: 200 INJECTION, SOLUTION INTRAVENOUS at 12:04

## 2020-01-01 RX ADMIN — SODIUM POLYSTYRENE SULFONATE 30 G: 15 SUSPENSION ORAL; RECTAL at 07:30

## 2020-01-01 RX ADMIN — HYDRALAZINE HYDROCHLORIDE 10 MG: 20 INJECTION INTRAMUSCULAR; INTRAVENOUS at 18:25

## 2020-01-01 RX ADMIN — SODIUM CHLORIDE, POTASSIUM CHLORIDE, SODIUM LACTATE AND CALCIUM CHLORIDE: 600; 310; 30; 20 INJECTION, SOLUTION INTRAVENOUS at 06:44

## 2020-01-01 ASSESSMENT — ENCOUNTER SYMPTOMS
SHORTNESS OF BREATH: 1
SHORTNESS OF BREATH: 1
EYE REDNESS: 1
GASTROINTESTINAL NEGATIVE: 1
RESPIRATORY NEGATIVE: 1
BLOOD IN STOOL: 1
VOMITING: 1
ALLERGIC/IMMUNOLOGIC NEGATIVE: 1
ABDOMINAL PAIN: 1
EYES NEGATIVE: 1
CONSTIPATION: 1
CHEST TIGHTNESS: 1

## 2020-01-01 ASSESSMENT — PAIN SCALES - GENERAL
PAINLEVEL_OUTOF10: 4
PAINLEVEL_OUTOF10: 6
PAINLEVEL_OUTOF10: 0
PAINLEVEL_OUTOF10: 10
PAINLEVEL_OUTOF10: 0
PAINLEVEL_OUTOF10: 0

## 2020-01-01 ASSESSMENT — COPD QUESTIONNAIRES: CAT_SEVERITY: MODERATE

## 2020-01-01 ASSESSMENT — LIFESTYLE VARIABLES: SMOKING_STATUS: 1

## 2020-01-01 ASSESSMENT — PAIN - FUNCTIONAL ASSESSMENT: PAIN_FUNCTIONAL_ASSESSMENT: 0-10

## 2020-01-14 ENCOUNTER — OFFICE VISIT (OUTPATIENT)
Dept: PULMONOLOGY | Facility: CLINIC | Age: 70
End: 2020-01-14

## 2020-01-14 VITALS
OXYGEN SATURATION: 98 % | DIASTOLIC BLOOD PRESSURE: 98 MMHG | HEIGHT: 60 IN | WEIGHT: 124 LBS | SYSTOLIC BLOOD PRESSURE: 142 MMHG | HEART RATE: 82 BPM | BODY MASS INDEX: 24.35 KG/M2

## 2020-01-14 DIAGNOSIS — R05.9 COUGH: ICD-10-CM

## 2020-01-14 DIAGNOSIS — Z87.891 PERSONAL HISTORY OF NICOTINE DEPENDENCE: ICD-10-CM

## 2020-01-14 DIAGNOSIS — R91.1 NODULE OF LOWER LOBE OF RIGHT LUNG: Primary | ICD-10-CM

## 2020-01-14 PROCEDURE — 99204 OFFICE O/P NEW MOD 45 MIN: CPT | Performed by: INTERNAL MEDICINE

## 2020-01-14 RX ORDER — ALBUTEROL SULFATE 2.5 MG/3ML
2.5 SOLUTION RESPIRATORY (INHALATION) EVERY 4 HOURS PRN
COMMUNITY

## 2020-01-14 RX ORDER — LISINOPRIL 10 MG/1
10 TABLET ORAL
COMMUNITY
Start: 2019-12-18 | End: 2020-02-12 | Stop reason: ALTCHOICE

## 2020-01-14 NOTE — PROGRESS NOTES
Subjective   Viky Cortez is a 69 y.o. female.     Background: Pt with prominent right hilum and 5 mm nodule in RLL, with tree in bud features 11/1/19, 7 mm nodule identified 2018    Chief Complaint   Patient presents with   • Lung Nodule        History of Present Illness   She had pneumonia and was very sick from that and had to stay at home taking antibiotics,  She was given abx and steroids, 2 rounds, first just after Thanksgiving and a second one in December with recrudescence of symptoms.  She does not feel altogether well but is starting to get some energy back.  She has moderately severe cough associated with congestion and mild dyspnea on exertion in chest for 6 weeks improving with no aggravating or alleviating factors.  She is cutting back smoking and I advised her to stop altogether.  She had abnormal imaging discussed above.  We are asked by Dr. Espinoza to see her after she has had a series of abnormal imaging without resolution over the course of treatment of above.    Medical/Family/Social History   has a past medical history of Hypertension, Joint pain, Neck mass, and Osteoporosis.   has a past surgical history that includes Knee surgery; Meniscectomy (Left); Panendoscopy (N/A, 9/30/2016); Excision Mass Head/Neck (Right, 9/30/2016); and Eye surgery.  family history includes No Known Problems in her father and mother.   reports that she has been smoking cigarettes. She has been smoking about 1.00 pack per day. She has never used smokeless tobacco. She reports that she drinks about 1.0 standard drinks of alcohol per week. She reports that she does not use drugs.  Allergies   Allergen Reactions   • Influenza Vaccines      Medications    Current Outpatient Medications:   •  albuterol (PROAIR HFA) 108 (90 BASE) MCG/ACT inhaler, Every 6 (Six) Hours., Disp: , Rfl:   •  albuterol (PROVENTIL) (2.5 MG/3ML) 0.083% nebulizer solution, Take 2.5 mg by nebulization Every 4 (Four) Hours As Needed for Wheezing.,  "Disp: , Rfl:   •  denosumab (PROLIA) 60 MG/ML solution prefilled syringe syringe, Inject 60 mg under the skin into the appropriate area as directed., Disp: , Rfl:   •  lisinopril (PRINIVIL,ZESTRIL) 10 MG tablet, Take 10 mg by mouth., Disp: , Rfl:   •  Multiple Vitamins-Minerals (MULTIVITAMIN ADULT PO), Take  by mouth., Disp: , Rfl:     Review of Systems   Constitutional: Negative for chills and fever.   HENT: Negative for trouble swallowing and voice change.    Eyes: Negative for photophobia and visual disturbance.   Respiratory: Negative for shortness of breath.    Gastrointestinal: Negative for abdominal pain, nausea, vomiting and GERD.   Genitourinary: Negative for difficulty urinating and hematuria.   Musculoskeletal: Negative for gait problem and joint swelling.   Skin: Negative for pallor and skin lesions.   Neurological: Negative for tremors, weakness and confusion.   Hematological: Negative for adenopathy. Does not bruise/bleed easily.   Psychiatric/Behavioral: The patient is not nervous/anxious.      Objective   /98   Pulse 82   Ht 152.4 cm (60\")   Wt 56.2 kg (124 lb)   SpO2 98% Comment: RA  Breastfeeding No   BMI 24.22 kg/m²   Physical Exam   Constitutional: She appears well-developed. She does not appear ill. No distress.   HENT:   Head: Atraumatic.   Nose: Nose normal.   Eyes: Conjunctivae and EOM are normal. No scleral icterus.   Neck: Neck supple.   Cardiovascular: Normal rate, regular rhythm, S1 normal and S2 normal.   Pulmonary/Chest: Effort normal and breath sounds normal.   Abdominal: Soft. She exhibits no distension. There is no tenderness.   Musculoskeletal: She exhibits no deformity.   Lymphadenopathy:     She has no cervical adenopathy.   Neurological: She is alert.   Skin: Skin is warm. No rash noted.   Psychiatric: She has a normal mood and affect.     -----------------------------------------------------------------------------------------------  CT CHEST WO CONTRAST 12/23/2019 " "9:34 AM  HISTORY: R93.89  COMPARISON: CT scans dated 11/1/2019, 11/11/2019 and a chest x-ray  dated 12/17/2019   DLP: 249 mGy cm  TECHNIQUE: Serial helical tomographic images of the chest were  acquired following the infusion of Isovue contrast intravenously. Bone  and soft tissue algorithms were provided.    FINDINGS:   Neck base: The imaged portion of the neck and thyroid gland is  unremarkable.   Lungs: Reidentified irregularly-shaped, elongated/ nodular opacities  in the superior segment right lower lobe. Several of these opacities  are quite linear, in a type distribution (series 3-images 138 through  144, series 601-images 61 through 70). The extent of these opacities  is increasing, measuring at least 5.5 cm in greatest extent. There is  no pleural effusion.  Heart: The heart is normal in size. There is no pericardial effusion.   Vessels: Thoracic aorta is normal in caliber. Moderate calcification  in the arch. The central pulmonary arteries are nondilated.  Lymph nodes: Increasing right hilar adenopathy, with the right hilar  tu conglomerate measuring up to 3.8 cm on this exam (series 2-image  133).  Skeletal and soft tissues: The osseous structures of the thorax and  surrounding soft tissues demonstrate no worrisome lesion or acute  process.  Upper abdomen: The imaged portion of the upper abdomen demonstrates no  acute process.  IMPRESSION:  1. Previously identified abnormality/ nodularity centered in the right  lower lobe has increased. The adjacent right hilar adenopathy is also  increased. This primary abnormality in the right lower lobe is  irregular in its shape, with an almost  \"finger-in-glove\" type  appearance as can be seen with atypical infectious/inflammatory  processes such as allergic bronchopulmonary aspergillosis. However,  the extent of the change as well as the extent of the right hilar  adenopathy is concerning, given that the primary differential  consideration is primary lung " malignancy with hilar tu metastases.  Tissue sampling should be considered.  Signed by Dr Jered Iraheta on 12/23/2019 2:08 PM    My interpretation of CT:  Very irregular masslike opacity and hilar adenopathy on right side     -----------------------------------------------------------------------------------------------  Assessment/Plan   Problem List Items Addressed This Visit        Pulmonary Problems    Nodule of lower lobe of right lung - Primary    Overview     5 mm identified 11/1/19, with tree in bud infiltrate         Relevant Orders    BRONCHOSCOPY ENDOSCOPY       Other    Personal history of nicotine dependence      Other Visit Diagnoses     Cough            Patient's Body mass index is 24.22 kg/m². BMI is within normal parameters. No follow-up required..      Will schedule bronchoscopy to evaluate airways, biopsy, wash and evaluate for atypical organisms  If this is negative then next step is interventional pulmonology ?radial probe  Nodes appear too distal for me to approach with conventional EBUS.  No additional antibiotics for now       Electronically signed by Rudi Jean MD, 1/14/2020, 4:15 PM

## 2020-01-14 NOTE — PATIENT INSTRUCTIONS
Flexible Bronchoscopy    Flexible bronchoscopy is a procedure that is used to examine the passageways in the lungs. During the procedure, a thin, flexible tool with a camera on it (bronchoscope) is passed into the mouth or nose, down through the windpipe (trachea), and into the air tubes (bronchi) in the lungs. This tool allows your health care provider to look at your lungs from the inside and take testing (diagnostic) samples if needed.  Tell a health care provider about:  · Any allergies you have.  · All medicines you are taking, including vitamins, herbs, eye drops, creams, and over-the-counter medicines.  · Any problems you or family members have had with anesthetic medicines.  · Any blood disorders you have.  · Any surgeries you have had.  · Any medical conditions you have.  · Whether you are pregnant or may be pregnant.  What are the risks?  Generally, this is a safe procedure. However, problems may occur, including:  · Infection.  · Bleeding.  · Damage to other structures or organs.  · Allergic reactions to medicines.  · Collapsed lung (pneumothorax).  · Increased need for oxygen or difficulty breathing after the procedure.  What happens before the procedure?  Medicines  Ask your health care provider about:  · Changing or stopping your regular medicines. This is especially important if you are taking diabetes medicines or blood thinners.  · Taking medicines such as aspirin and ibuprofen. These medicines can thin your blood. Do not take these medicines before your procedure if your health care provider instructs you not to.  You may be given antibiotic medicine to help prevent infection.  Staying hydrated  Follow instructions from your health care provider about hydration, which may include:  · Up to 2 hours before the procedure - you may continue to drink clear liquids, such as water, clear fruit juice, black coffee, and plain tea.  Eating and drinking  Follow instructions from your health care provider  about eating and drinking, which may include:  · 8 hours before the procedure - stop eating heavy meals or foods such as meat, fried foods, or fatty foods.  · 6 hours before the procedure - stop eating light meals or foods, such as toast or cereal.  · 6 hours before the procedure - stop drinking milk or drinks that contain milk.  · 2 hours before the procedure - stop drinking clear liquids.  General instructions  · Plan to have someone take you home from the hospital or clinic.  · If you will be going home right after the procedure, plan to have someone with you for 24 hours.  What happens during the procedure?  · To lower your risk of infection:  ? Your health care team will wash or sanitize their hands.  ? Your skin will be washed with soap.  · An IV tube will be inserted into one of your veins.  · You will be given a medicine (local anesthetic) to numb your mouth, nose, throat, and voice box (larynx). You may also be given one or more of the following:  ? A medicine to help you relax (sedative).  ? A medicine to control coughing.  ? A medicine to dry up any fluids in your lungs (secretions).  · A bronchoscope will be passed into your nose or mouth, and into your lungs. Your health care provider will examine your lungs.  · Samples of airway secretions may be collected for testing.  · If abnormal areas are seen in your airways, tissue samples may be removed for examination under a microscope (biopsy).  · If tissue samples are needed from the outer parts of the lung, a type of X-ray (fluoroscopy) may be used to guide the bronchoscope to these areas.  · If bleeding occurs, you may be given medicine to stop or decrease the bleeding.  The procedure may vary among health care providers and hospitals.  What happens after the procedure?  · Do not drive for 24 hours if you were given a sedative.  · Your blood pressure, heart rate, breathing rate, and blood oxygen level will be monitored until the medicines you were given  have worn off.  · You may have a chest X-ray to check for signs of pneumothorax.  · You will not be allowed to eat or drink anything for 2 hours after your procedure.  · If a biopsy was taken, it is up to you to get the results of your procedure. Ask your health care provider, or the department that is doing the procedure, when your results will be ready.  Summary  · Flexible bronchoscopy is a procedure that allows your health care provider to look closely at your lungs from the inside and take testing (diagnostic) samples if needed.  · Risks of flexible bronchoscopy include bleeding, infection, and pneumothorax.  · Before a flexible bronchoscopy, you will be given a medicine (local anesthetic) to numb your mouth, nose, throat, and voice box (larynx). Then, a bronchoscope will be passed into your nose or mouth, and into your lungs.  · After the procedure, your blood pressure, heart rate, breathing rate, and blood oxygen level will be monitored until the medicines you were given have worn off. You may have a chest X-ray to check for signs of pneumothorax.  · You will not be allowed to eat or drink anything for 2 hours after your procedure.  This information is not intended to replace advice given to you by your health care provider. Make sure you discuss any questions you have with your health care provider.  Document Released: 12/15/2001 Document Revised: 01/20/2018 Document Reviewed: 01/20/2018  [employer] Interactive Patient Education © 2019 [employer]

## 2020-01-16 ENCOUNTER — TELEPHONE (OUTPATIENT)
Dept: PULMONOLOGY | Facility: CLINIC | Age: 70
End: 2020-01-16

## 2020-01-16 NOTE — TELEPHONE ENCOUNTER
Pt scheduled for bronchoscopy at [facility] on Friday 01/17/20 at 7:15am.  Pt informed.

## 2020-01-17 ENCOUNTER — OUTSIDE FACILITY SERVICE (OUTPATIENT)
Dept: PULMONOLOGY | Facility: CLINIC | Age: 70
End: 2020-01-17

## 2020-01-17 PROBLEM — J44.1 COPD EXACERBATION (HCC): Status: RESOLVED | Noted: 2019-01-01 | Resolved: 2020-01-01

## 2020-01-17 PROBLEM — R91.8 RIGHT LOWER LOBE LUNG MASS: Status: ACTIVE | Noted: 2020-01-01

## 2020-01-17 PROCEDURE — 31623 DX BRONCHOSCOPE/BRUSH: CPT | Performed by: INTERNAL MEDICINE

## 2020-01-17 NOTE — OP NOTE
Bronchoscopy Procedure Note    Date of Operation: 01/17/20    Pre-op Diagnosis: Right lower lobe lung mass    Post-op Diagnosis: Same    Surgeon: April Johnson    Anesthesia: Monitored anesthesia care  Topical 1% lidocaine 5ml  to vocal cords and 5 ml central airways via bronchoscope. Operation: Flexible fiberoptic bronchoscopy, diagnostic without c-arm    Bronchoscopy, Diagnostic was performed    Findings: endobronchial tumor visible at orifice to rml and rll. See photos    Specimen: bronch wash, brush    Estimated Blood Loss: less than 50     Complications: none    Indications and History:  The patient is a 78 y.o.  female with Right lower lobe lung mass. The risks, benefits, complications, treatment options and expected outcomes were discussed with the patient. The possibilities of reaction to medication, pulmonary aspiration, perforation of a viscus, bleeding, failure to diagnose a condition and creating a complication requiring transfusion or operation were discussed with the patient who freely signed the consent. Time out was taken prior to the procedure. Description of Procedure:    After the induction of topical nasopharyngeal anesthesia, the patient was positioned supine and the bronchoscope was passed through the right naris . The vocal cords were visualized and 2% plain lidocaine was topically placed onto the cords. The cords were edematous. The scope was then passed into the trachea. 1% plain lidocaine 5 ml was used topically on the arie.       The trachea, mainstem bronchi, RUL, RML, Bronchus Intermedius, RLL, RADHIKA, RADHIKA upper division and lingula, and LLL, and all primary segmental airways were examined and were normal except as described below:    Endobronchial findings:    Trachea: Normal mucosa  Arie: Sharp  Right main bronchus: Normal mucosa  Right upper lobe bronchus: Normal mucosa  Right middle lobe bronchus: tumor at lateral orifice, see photo taken from bronchus intermedius  Right

## 2020-01-17 NOTE — ANESTHESIA POSTPROCEDURE EVALUATION
Department of Anesthesiology  Postprocedure Note    Patient: Nahed Subramanian  MRN: [id number]  YOB: 1950  Date of evaluation: 1/17/2020  Time:  7:41 AM     Procedure Summary     Date:  01/17/20 Room / Location:  [address]    Anesthesia Start:  9178 Anesthesia Stop:  7646    Procedure:  BRONCHOSCOPY (N/A ) Diagnosis:  (.)    Surgeon:  Anna Wallace MD Responsible Provider:  RANDALL Amezcua CRNA    Anesthesia Type:  general, TIVA ASA Status:  3          Anesthesia Type: general, TIVA    Lyn Phase I: Lyn Score: 10    Lyn Phase II:      Last vitals: Reviewed and per EMR flowsheets.        Anesthesia Post Evaluation    Patient location during evaluation: bedside  Patient participation: complete - patient participated  Level of consciousness: awake and alert  Pain score: 0  Airway patency: patent  Nausea & Vomiting: no nausea and no vomiting  Complications: no  Cardiovascular status: hemodynamically stable and blood pressure returned to baseline  Respiratory status: acceptable and room air  Hydration status: stable

## 2020-01-17 NOTE — ANESTHESIA PRE PROCEDURE
found for: PROTIME, INR, APTT    HCG (If Applicable): No results found for: PREGTESTUR, PREGSERUM, HCG, HCGQUANT     ABGs: No results found for: PHART, PO2ART, FND5EUE, JEE0TJZ, BEART, W3IJNNWB     Type & Screen (If Applicable):  No results found for: LABABO, [address]    Anesthesia Evaluation  Patient summary reviewed and Nursing notes reviewed no history of anesthetic complications:   Airway: Mallampati: II  TM distance: >3 FB   Neck ROM: full  Mouth opening: > = 3 FB Dental:      Comment: Poor dentition, majority missing, some teeth still there, nothing loose    Pulmonary:   (+) COPD: moderate,  current smoker          Patient did not smoke on day of surgery. ROS comment: Lung nodule RLL  Uses Albuterol inhalers PRN   Cardiovascular:    (+) hypertension:,     (-) pacemaker, past MI and CABG/stent      Rhythm: regular  Rate: normal           Beta Blocker:  Not on Beta Blocker         Neuro/Psych:   Negative Neuro/Psych ROS              GI/Hepatic/Renal: Neg GI/Hepatic/Renal ROS       (-) GERD, hepatitis, liver disease and no renal disease       Endo/Other:        (-) diabetes mellitus, hypothyroidism, hyperthyroidism, blood dyscrasia        Pt had no PAT visit       Abdominal:           Vascular: negative vascular ROS. Anesthesia Plan      general and TIVA     ASA 3       Induction: intravenous. Anesthetic plan and risks discussed with patient. Plan discussed with CRNA.                   RANDALL Curtis - CRNA   1/17/2020

## 2020-01-29 ENCOUNTER — TELEPHONE (OUTPATIENT)
Dept: PULMONOLOGY | Facility: CLINIC | Age: 70
End: 2020-01-29

## 2020-01-29 NOTE — TELEPHONE ENCOUNTER
Please let her know that the final diagnosis of the bronchial wash/ brush was negative for malignancy but did show mild acute and chronic inflammation. I discussed her case with Dr. Jean on Monday. He would like for her to be referred to [facility] or Dr. Fay at [place] for possible radial probe. Keep her apt on 2/3/20 and we can discuss further. She should contact her PCP about the dizziness.

## 2020-01-29 NOTE — TELEPHONE ENCOUNTER
Patient had to cancel her appointment today because she is having a lot of problems with dizziness and did not feel she could drive herself.  She is wanting to know if she can get her test results over the phone? She did reschedule her appointment for 2/3/20.

## 2020-02-05 NOTE — PROGRESS NOTES
Orders   1. Confusion-etiology unknown worrisome for metastatic brain disease MRI BRAIN W WO CONTRAST    CBC    Comprehensive Metabolic Panel    cefTRIAXone (ROCEPHIN) 1,000 mg in lidocaine 1 % 2.86 mL IM Injection    TSH without Reflex   2. Acute cystitis without hematuria     3. Low sodium levels- more than likely secondary to HCTZ but the possibility of malignancy is also in the differential    4. Abnormal CT of the chest- worrisome for cancer         PLAN:    1. MRI of the brain with and without. Blood work. 2.  Rocephin injection today and tomorrow. Obtain records from outside hospital especially cultures. 3.  I explained to her that I do want her taking HCTZ secondary to the hyponatremia. I also want her to 1.5 L fluid restriction. [place] use of salt.   4.  Follow-up with  [facility] ASAP  Follow-up 1 week

## 2020-02-06 ENCOUNTER — APPOINTMENT (OUTPATIENT)
Dept: CT IMAGING | Facility: HOSPITAL | Age: 70
End: 2020-02-06

## 2020-02-06 ENCOUNTER — HOSPITAL ENCOUNTER (EMERGENCY)
Facility: HOSPITAL | Age: 70
Discharge: HOME | End: 2020-02-07
Attending: INTERNAL MEDICINE | Admitting: INTERNAL MEDICINE

## 2020-02-06 DIAGNOSIS — E86.0 DEHYDRATION: Primary | ICD-10-CM

## 2020-02-06 DIAGNOSIS — E87.1 HYPONATREMIA: ICD-10-CM

## 2020-02-06 LAB
ALBUMIN SERPL-MCNC: 4.5 G/DL (ref 3.5–5.2)
ALBUMIN/GLOB SERPL: 1.9 G/DL
ALP SERPL-CCNC: 49 U/L (ref 39–117)
ALT SERPL W P-5'-P-CCNC: 21 U/L (ref 1–33)
ANION GAP SERPL CALCULATED.3IONS-SCNC: 11 MMOL/L (ref 5–15)
AST SERPL-CCNC: 42 U/L (ref 1–32)
BASOPHILS # BLD AUTO: 0.01 10*3/MM3 (ref 0–0.2)
BASOPHILS NFR BLD AUTO: 0.1 % (ref 0–1.5)
BILIRUB SERPL-MCNC: 0.5 MG/DL (ref 0.2–1.2)
BUN BLD-MCNC: 33 MG/DL (ref 8–23)
BUN/CREAT SERPL: 30.3 (ref 7–25)
CALCIUM SPEC-SCNC: 10 MG/DL (ref 8.6–10.5)
CHLORIDE SERPL-SCNC: 84 MMOL/L (ref 98–107)
CO2 SERPL-SCNC: 31 MMOL/L (ref 22–29)
CREAT BLD-MCNC: 1.09 MG/DL (ref 0.57–1)
DEPRECATED RDW RBC AUTO: 41.3 FL (ref 37–54)
EOSINOPHIL # BLD AUTO: 0.02 10*3/MM3 (ref 0–0.4)
EOSINOPHIL NFR BLD AUTO: 0.3 % (ref 0.3–6.2)
ERYTHROCYTE [DISTWIDTH] IN BLOOD BY AUTOMATED COUNT: 12.5 % (ref 12.3–15.4)
GFR SERPL CREATININE-BSD FRML MDRD: 50 ML/MIN/1.73
GLOBULIN UR ELPH-MCNC: 2.4 GM/DL
GLUCOSE BLD-MCNC: 176 MG/DL (ref 65–99)
HCT VFR BLD AUTO: 35.6 % (ref 34–46.6)
HGB BLD-MCNC: 12.9 G/DL (ref 12–15.9)
IMM GRANULOCYTES # BLD AUTO: 0.06 10*3/MM3 (ref 0–0.05)
IMM GRANULOCYTES NFR BLD AUTO: 0.8 % (ref 0–0.5)
LYMPHOCYTES # BLD AUTO: 0.34 10*3/MM3 (ref 0.7–3.1)
LYMPHOCYTES NFR BLD AUTO: 4.7 % (ref 19.6–45.3)
MCH RBC QN AUTO: 33 PG (ref 26.6–33)
MCHC RBC AUTO-ENTMCNC: 36.2 G/DL (ref 31.5–35.7)
MCV RBC AUTO: 91 FL (ref 79–97)
MONOCYTES # BLD AUTO: 0.63 10*3/MM3 (ref 0.1–0.9)
MONOCYTES NFR BLD AUTO: 8.8 % (ref 5–12)
NEUTROPHILS # BLD AUTO: 6.12 10*3/MM3 (ref 1.7–7)
NEUTROPHILS NFR BLD AUTO: 85.3 % (ref 42.7–76)
NRBC BLD AUTO-RTO: 0 /100 WBC (ref 0–0.2)
PLATELET # BLD AUTO: 183 10*3/MM3 (ref 140–450)
PMV BLD AUTO: 8.3 FL (ref 6–12)
POTASSIUM BLD-SCNC: 3.2 MMOL/L (ref 3.5–5.2)
PROT SERPL-MCNC: 6.9 G/DL (ref 6–8.5)
RBC # BLD AUTO: 3.91 10*6/MM3 (ref 3.77–5.28)
SODIUM BLD-SCNC: 126 MMOL/L (ref 136–145)
TSH SERPL DL<=0.05 MIU/L-ACNC: 0.45 UIU/ML (ref 0.27–4.2)
WBC NRBC COR # BLD: 7.18 10*3/MM3 (ref 3.4–10.8)

## 2020-02-06 PROCEDURE — 80053 COMPREHEN METABOLIC PANEL: CPT | Performed by: INTERNAL MEDICINE

## 2020-02-06 PROCEDURE — 84443 ASSAY THYROID STIM HORMONE: CPT | Performed by: INTERNAL MEDICINE

## 2020-02-06 PROCEDURE — 99283 EMERGENCY DEPT VISIT LOW MDM: CPT

## 2020-02-06 PROCEDURE — 36415 COLL VENOUS BLD VENIPUNCTURE: CPT

## 2020-02-06 PROCEDURE — 85025 COMPLETE CBC W/AUTO DIFF WBC: CPT | Performed by: INTERNAL MEDICINE

## 2020-02-06 PROCEDURE — 70450 CT HEAD/BRAIN W/O DYE: CPT

## 2020-02-06 PROCEDURE — 72131 CT LUMBAR SPINE W/O DYE: CPT

## 2020-02-07 VITALS
RESPIRATION RATE: 16 BRPM | OXYGEN SATURATION: 95 % | HEART RATE: 63 BPM | TEMPERATURE: 97.9 F | WEIGHT: 120 LBS | BODY MASS INDEX: 22.66 KG/M2 | SYSTOLIC BLOOD PRESSURE: 130 MMHG | HEIGHT: 61 IN | DIASTOLIC BLOOD PRESSURE: 65 MMHG

## 2020-02-07 RX ADMIN — SODIUM CHLORIDE 1000 ML: 9 INJECTION, SOLUTION INTRAVENOUS at 00:03

## 2020-02-07 NOTE — DISCHARGE INSTRUCTIONS
Dehydration, Adult    Dehydration is when there is not enough fluid or water in your body. This happens when you lose more fluids than you take in. Dehydration can range from mild to very bad. It should be treated right away to keep it from getting very bad.  Symptoms of mild dehydration may include:  · Thirst.  · Dry lips.  · Slightly dry mouth.  · Dry, warm skin.  · Dizziness.  Symptoms of moderate dehydration may include:  · Very dry mouth.  · Muscle cramps.  · Dark pee (urine). Pee may be the color of tea.  · Your body making less pee.  · Your eyes making fewer tears.  · Heartbeat that is uneven or faster than normal (palpitations).  · Headache.  · Light-headedness, especially when you stand up from sitting.  · Fainting (syncope).  Symptoms of very bad dehydration may include:  · Changes in skin, such as:  ? Cold and clammy skin.  ? Blotchy (mottled) or pale skin.  ? Skin that does not quickly return to normal after being lightly pinched and let go (poor skin turgor).  · Changes in body fluids, such as:  ? Feeling very thirsty.  ? Your eyes making fewer tears.  ? Not sweating when body temperature is high, such as in hot weather.  ? Your body making very little pee.  · Changes in vital signs, such as:  ? Weak pulse.  ? Pulse that is more than 100 beats a minute when you are sitting still.  ? Fast breathing.  ? Low blood pressure.  · Other changes, such as:  ? Sunken eyes.  ? Cold hands and feet.  ? Confusion.  ? Lack of energy (lethargy).  ? Trouble waking up from sleep.  ? Short-term weight loss.  ? Unconsciousness.  Follow these instructions at home:    · If told by your doctor, drink an ORS:  ? Make an ORS by using instructions on the package.  ? Start by drinking small amounts, about ½ cup (120 mL) every 5-10 minutes.  ? Slowly drink more until you have had the amount that your doctor said to have.  · Drink enough clear fluid to keep your pee clear or pale yellow. If you were told to drink an ORS, finish the  ORS first, then start slowly drinking clear fluids. Drink fluids such as:  ? Water. Do not drink only water by itself. Doing that can make the salt (sodium) level in your body get too low (hyponatremia).  ? Ice chips.  ? Fruit juice that you have added water to (diluted).  ? Low-calorie sports drinks.  · Avoid:  ? Alcohol.  ? Drinks that have a lot of sugar. These include high-calorie sports drinks, fruit juice that does not have water added, and soda.  ? Caffeine.  ? Foods that are greasy or have a lot of fat or sugar.  · Take over-the-counter and prescription medicines only as told by your doctor.  · Do not take salt tablets. Doing that can make the salt level in your body get too high (hypernatremia).  · Eat foods that have minerals (electrolytes). Examples include bananas, oranges, potatoes, tomatoes, and spinach.  · Keep all follow-up visits as told by your doctor. This is important.  Contact a doctor if:  · You have belly (abdominal) pain that:  ? Gets worse.  ? Stays in one area (localizes).  · You have a rash.  · You have a stiff neck.  · You get angry or annoyed more easily than normal (irritability).  · You are more sleepy than normal.  · You have a harder time waking up than normal.  · You feel:  ? Weak.  ? Dizzy.  ? Very thirsty.  · You have peed (urinated) only a small amount of very dark pee during 6-8 hours.  Get help right away if:  · You have symptoms of very bad dehydration.  · You cannot drink fluids without throwing up (vomiting).  · Your symptoms get worse with treatment.  · You have a fever.  · You have a very bad headache.  · You are throwing up or having watery poop (diarrhea) and it:  ? Gets worse.  ? Does not go away.  · You have blood or something green (bile) in your throw-up.  · You have blood in your poop (stool). This may cause poop to look black and tarry.  · You have not peed in 6-8 hours.  · You pass out (faint).  · Your heart rate when you are sitting still is more than 100 beats a  minute.  · You have trouble breathing.  This information is not intended to replace advice given to you by your health care provider. Make sure you discuss any questions you have with your health care provider.  Document Released: 10/14/2010 Document Revised: 07/07/2017 Document Reviewed: 02/10/2017  [employer] Interactive Patient Education © 2019 [employer]

## 2020-02-07 NOTE — ED PROVIDER NOTES
Subjective   Patient is a 69-year-old female who presents the emergency room accompanied by her family as she has had increased confusion she was found yesterday to have travel to [place] not known how she got there.  Today apparently the patient and her  who has dementia as well and got into an argument and it sounds like that she may have fallen and hit her low back on the ground and because this she is here to be checked out further.          Review of Systems   Constitutional: Negative for chills, fatigue and fever.   HENT: Negative for congestion and facial swelling.    Eyes: Negative for photophobia, discharge and visual disturbance.   Respiratory: Negative for cough, shortness of breath and wheezing.    Cardiovascular: Negative for chest pain, palpitations and leg swelling.   Gastrointestinal: Negative for abdominal pain, diarrhea, nausea and vomiting.   Endocrine: Negative for cold intolerance and heat intolerance.   Genitourinary: Negative for difficulty urinating and urgency.   Musculoskeletal: Negative for arthralgias, joint swelling and myalgias.   Skin: Negative for color change and pallor.   Neurological: Negative for dizziness and light-headedness.   Hematological: Negative for adenopathy. Does not bruise/bleed easily.   Psychiatric/Behavioral: Positive for confusion and decreased concentration. Negative for agitation and behavioral problems.       Past Medical History:   Diagnosis Date   • Hypertension    • Joint pain    • Neck mass     RIGHT SIDE   • Osteoporosis        Allergies   Allergen Reactions   • Influenza Vaccines        Past Surgical History:   Procedure Laterality Date   • EXCISION MASS HEAD/NECK Right 9/30/2016    Procedure: EXCISION MASS HEAD NECK;  Surgeon: Quang Lopez MD;  Location: [facility] OR;  Service:    • EYE SURGERY     • KNEE SURGERY     • MENISCECTOMY Left    • PANENDOSCOPY N/A 9/30/2016    Procedure: PANENDOSCOPY;  Surgeon: Quang Lopez MD;  Location:   [facility] OR;  Service:        Family History   Problem Relation Age of Onset   • No Known Problems Mother    • No Known Problems Father        Social History     Socioeconomic History   • Marital status:      Spouse name: Not on file   • Number of children: Not on file   • Years of education: Not on file   • Highest education level: Not on file   Tobacco Use   • Smoking status: Current Every Day Smoker     Packs/day: 1.00     Types: Cigarettes   • Smokeless tobacco: Never Used   Substance and Sexual Activity   • Alcohol use: Yes     Alcohol/week: 1.0 standard drinks     Types: 1 Shots of liquor per week     Comment: once nightly   • Drug use: No   • Sexual activity: Defer           Objective   Physical Exam   Constitutional: She is oriented to person, place, and time. She appears well-developed and well-nourished.   HENT:   Head: Normocephalic and atraumatic.   Mouth/Throat: Oropharynx is clear and moist.   Eyes: Pupils are equal, round, and reactive to light. Conjunctivae and EOM are normal.   Neck: Normal range of motion. Neck supple.   Cardiovascular: Normal rate, regular rhythm, normal heart sounds and intact distal pulses.   Pulmonary/Chest: Effort normal and breath sounds normal.   Abdominal: Soft. Bowel sounds are normal. She exhibits no distension.   Musculoskeletal: Normal range of motion. She exhibits no edema.   Neurological: She is alert and oriented to person, place, and time. No cranial nerve deficit.   Skin: Skin is warm and dry.   Psychiatric: She has a normal mood and affect. Her behavior is normal. Thought content normal.   Nursing note and vitals reviewed.      Procedures           ED Course                                               MDM    Final diagnoses:   Dehydration   Hyponatremia            Luis Turner MD  02/07/20 0154

## 2020-02-07 NOTE — ED NOTES
Pt's daughter states patient was not confused until the day she disappeared and was found in [place].  She states there is no noted one-sided weakness and pt. Is able to ambulate.  She has not eaten much in a couple of days, and has not slept much in the past day until she arrived at our ED.  Pt. Is currently sleeping     Karolyn Will, RN  02/06/20 [id number]

## 2020-02-07 NOTE — ED NOTES
Daughter present states she lives with patient and her spouse.  She states the spouse is not at the house and will not be there until further notice.     Karolyn Will RN  02/06/20 [id number]

## 2020-02-12 ENCOUNTER — OFFICE VISIT (OUTPATIENT)
Dept: PULMONOLOGY | Facility: CLINIC | Age: 70
End: 2020-02-12

## 2020-02-12 VITALS
OXYGEN SATURATION: 95 % | WEIGHT: 114 LBS | HEART RATE: 67 BPM | SYSTOLIC BLOOD PRESSURE: 130 MMHG | HEIGHT: 61 IN | BODY MASS INDEX: 21.52 KG/M2 | DIASTOLIC BLOOD PRESSURE: 80 MMHG

## 2020-02-12 DIAGNOSIS — R91.1 NODULE OF LOWER LOBE OF RIGHT LUNG: Primary | ICD-10-CM

## 2020-02-12 DIAGNOSIS — Z87.891 PERSONAL HISTORY OF NICOTINE DEPENDENCE: ICD-10-CM

## 2020-02-12 PROCEDURE — 99213 OFFICE O/P EST LOW 20 MIN: CPT | Performed by: NURSE PRACTITIONER

## 2020-02-12 RX ORDER — ALBUTEROL SULFATE 90 UG/1
2 AEROSOL, METERED RESPIRATORY (INHALATION) EVERY 6 HOURS PRN
Qty: 1 INHALER | Refills: 2 | Status: SHIPPED | OUTPATIENT
Start: 2020-02-12

## 2020-02-12 NOTE — PROGRESS NOTES
ANTONIA Zheng  [facility]   Respiratory Disease Clinic  [address] [place] [address]  Phone: [phone]  Fax: [phone]     Viky Cortez is a 69 y.o. female.   CC:   Chief Complaint   Patient presents with   • nodule of lower lobe of right lung        HPI: Ms. Cortez is a pleasant 69-year-old female who is here for a follow-up from her bronchoscopy on January 17 of 2020.  She was seen by Dr. Jean last month for a prominent right hilum and 5 mm nodule in the right lower lobe with tree-in-bud features on imaging 11/1/2019.  Noted to be a 7 mm nodule identified in 2018.  Final cytology from the bronchoscopy was negative for evidence of malignancy, mild acute and chronic inflammation identified. She has had some recent development of signs of dementia. She has two of her step children with her today. One from [place] leaving on Sunday and one from [place]. She also lives with another step daughter that just returned to work. She is to see per PCP today. She has a productive cough of thick clear sputum that has not worsened nor improved. Present for some time. She has SOB that is worse with exertion and better with rest. She has chronic fatigue. She has chest congestion. She is up to date on the Flu and PNA shot. She continues to smoke but is adamant that she has decreased her amount in an attempt to quit. She is struggling giving up the last little bit of smoking. She also reports a rash of the knee/thigh area that is pruritic and followed by her PCP.      The following portions of the patient's history were reviewed and updated as appropriate: allergies, current medications, past family history, past medical history, past social history, past surgical history and problem list.    Past Medical History:   Diagnosis Date   • Hypertension    • Joint pain    • Neck mass     RIGHT SIDE   • Osteoporosis        Family History   Problem Relation Age of Onset   • No Known  "Problems Mother    • No Known Problems Father        Social History     Socioeconomic History   • Marital status:      Spouse name: Not on file   • Number of children: Not on file   • Years of education: Not on file   • Highest education level: Not on file   Tobacco Use   • Smoking status: Current Every Day Smoker     Packs/day: 1.00     Types: Cigarettes   • Smokeless tobacco: Never Used   Substance and Sexual Activity   • Alcohol use: Yes     Alcohol/week: 1.0 standard drinks     Types: 1 Shots of liquor per week     Comment: once nightly   • Drug use: No   • Sexual activity: Defer       Review of Systems   Constitutional: Positive for fatigue. Negative for chills, diaphoresis and fever.   HENT: Positive for congestion (chest ). Negative for rhinorrhea and trouble swallowing.    Eyes: Negative for blurred vision, double vision and visual disturbance.   Respiratory: Positive for cough (productive of thick clear sputum ) and shortness of breath. Negative for wheezing.    Cardiovascular: Negative for chest pain, palpitations and leg swelling.   Gastrointestinal: Negative for abdominal distention, abdominal pain and nausea.   Endocrine: Negative for cold intolerance, heat intolerance and polydipsia.   Musculoskeletal: Negative for back pain, gait problem and myalgias.   Skin: Positive for rash (Bilateral -knees/thigh, itches ). Negative for color change and pallor.   Neurological: Negative for dizziness, syncope and confusion.   Hematological: Negative for adenopathy. Does not bruise/bleed easily.   Psychiatric/Behavioral: Negative for agitation, behavioral problems and sleep disturbance.       /80   Pulse 67   Ht 154.9 cm (61\")   Wt 51.7 kg (114 lb)   SpO2 95% Comment: RA  Breastfeeding No   BMI 21.54 kg/m²     Physical Exam   Constitutional: She is oriented to person, place, and time. She appears well-developed and well-nourished. No distress.   HENT:   Head: Normocephalic and atraumatic. " "  Mouth/Throat: Abnormal dentition.   Eyes: Pupils are equal, round, and reactive to light. Conjunctivae are normal.   Neck: Normal range of motion. Neck supple.   Cardiovascular: Normal rate, regular rhythm and normal heart sounds. Exam reveals no gallop and no friction rub.   No murmur heard.  Pulmonary/Chest: Effort normal. No respiratory distress. She has decreased breath sounds. She has no wheezes.   Abdominal: Soft. Bowel sounds are normal.   Musculoskeletal: Normal range of motion. She exhibits no edema or deformity.   Lymphadenopathy:     She has no cervical adenopathy.   Neurological: She is alert and oriented to person, place, and time.   Skin: Skin is warm and dry. She is not diaphoretic. No erythema.   Psychiatric: She has a normal mood and affect. Her behavior is normal. Judgment and thought content normal.       Pulmonary Functions Testing Results:    My interpretation: no new    CT chest 12/23/19 at [facility]:     1. Previously identified abnormality/ nodularity centered in the right  lower lobe has increased. The adjacent right hilar adenopathy is also  increased. This primary abnormality in the right lower lobe is  irregular in its shape, with an almost  \"finger-in-glove\" type  appearance as can be seen with atypical infectious/inflammatory  processes such as allergic bronchopulmonary aspergillosis. However,  the extent of the change as well as the extent of the right hilar  adenopathy is concerning, given that the primary differential  consideration is primary lung malignancy with hilar tu metastases.  Tissue sampling should be considered.  Signed by Dr Jered Iraheta on 12/23/2019 2:08 PM    Cytology from Bronch 1/17/20  FINAL DIAGNOSIS:    A.  Lung, bronchial washing, smears, ThinPrep and cell block: Negative  for evidence of malignancy, mild acute and chronic inflammation  identified.    B.  Lung, bronchial brushing, ThinPrep and cell block: Negative for  evidence of malignancy, mild acute " and chronic inflammation identified.   CBG/CBG      Problem List Items Addressed This Visit        Respiratory    Nodule of lower lobe of right lung - Primary    Overview     5 mm identified 11/1/19, with tree in bud infiltrate            Other    Personal history of nicotine dependence        Patient's Body mass index is 21.54 kg/m². BMI is below normal parameters. Recommendations include: referral to primary care.      Assessment/Plan      Discussed case with Dr. Jean.  Given that the bronchoscopy was negative at this time he would like to refer the patient for interventional pulmonology questionable radial probe.  He felt that the nodes appear too distal for him to approach with a conventional EBUS.  Patient is accompanied today by 2 of her stepdaughters.  They are in agreement for her to be referred to Dr. Fay at [place].  They are advised that they will be notified of the upcoming appointment.    Viky Cortez  reports that she has been smoking cigarettes. She has been smoking about 1.00 pack per day. She has never used smokeless tobacco.. I have educated her on the risk of diseases from using tobacco products such as cancer, COPD and heart diease.     I advised her to quit and she is willing to quit however she is not ready to give up the last few cigarettes she is smoking a day.     Qing Owen, APRN  2/12/2020  1:29 PM    Return in about 4 weeks (around 3/11/2020).    This dictation was generated by voice recognition computer software. Although all attempts are made to edit dictation for accuracy, there may be errors in the transcription that are not intended.

## 2020-03-03 PROBLEM — E87.5 HYPERKALEMIA: Status: ACTIVE | Noted: 2020-01-01

## 2020-03-03 NOTE — ED NOTES
Bed: 07  Expected date:   Expected time:   Means of arrival:   Comments:  ruthann Melvin, RN  03/03/20 4530

## 2020-03-03 NOTE — PROGRESS NOTES
Pharmacy Renal Adjustment    Rocio Conner is a 78 y.o. female. Pharmacy has renally adjusted medications per protocol. Recent Labs     03/03/20  0422 03/03/20  0708   * 109*       Recent Labs     03/03/20  0422 03/03/20  0708   CREATININE 3.8* 3.4*       Estimated Creatinine Clearance: 12 mL/min (A) (based on SCr of 3.4 mg/dL (H)). Height:   Ht Readings from Last 1 Encounters:   03/03/20 5' 1\" (1.549 m)     Weight:  Wt Readings from Last 1 Encounters:   03/03/20 109 lb (49.4 kg)       CKD stage: TASHIA         Baseline SCr: 0.9    Plan: Adjust the following medications based on renal function:           Meropenem 1 gm IV every eight hours adjusted to Meropenem 500 mg IV every twelve hours.      Electronically signed by Micheal Mckeon [address] [place] [address] on 3/3/2020 at 9:17 AM

## 2020-03-03 NOTE — CONSULTS
Nephrology ([address] [facility]) Consult Note      Patient: Marysol Liu  YOB: 1950  Date of Service: 3/3/2020  MRN: [id number]   Acct: [de-identified]   Primary Care Physician: Rafat Brandon MD  Advance Directive: [facility]  Admit Date: 3/3/2020       Hospital Day: 0  Referring Provider: Carol Christensen MD    Patient independently seen and examined, Chart, Consults, Notes, Operative notes, Labs, Cardiology, and Radiology studies reviewed as able. Subjective: Marysol Liu is a 78 y.o. female  whom we were consulted for acute renal failure with hyperkalemia. Chart review shows normal baseline creatinine 1 week prior to admission and history of hyponatremia with a sodium increased from 122-141 over 20 days. At the lab draw done on February 25 her AST and ALT had increased from their normal ranges to 443 at 438 respectively as well. She has history of dementia and is unable to participate in history and physical examination. There is no family present at the bedside. Case was discussed with her nurse Rick Dawson at the bedside as well as GI and hospitalist.  There is history of coffee-ground emesis at the nursing home but none demonstrated since admission to the ER. Repeat EKG at no time examination demonstrated significant improvement in T wave function.           Allergies:  Influenza vaccines    Medicines:  Current Facility-Administered Medications   Medication Dose Route Frequency Provider Last Rate Last Dose    dextrose 50 % IV solution  25 g Intravenous PRN Rell Talley MD   25 g at 03/03/20 0610    sodium chloride flush 0.9 % injection 10 mL  10 mL Intravenous 2 times per day Carol Christensen MD        sodium chloride flush 0.9 % injection 10 mL  10 mL Intravenous PRN Carol Christensen MD        acetaminophen (TYLENOL) tablet 650 mg  650 mg Oral Q6H PRN Carol Christensen MD        Or    acetaminophen (TYLENOL) suppository 650 mg  650 mg Rectal Q6H PRN Carol Christensen MD       [facility] pulmonary disease) ([facility] [address]     H/O removal of neck cyst     Hypertension     Lung nodule     RLL    Osteoporosis        Past Surgical History:  Past Surgical History:   Procedure Laterality Date    BLEPHAROPLASTY      BRANCHIAL CYST EXCISION Right 09/30/2016    jared correia     BRONCHOSCOPY N/A 1/17/2020    BRONCHOSCOPY performed by [facility] Lim [facility] at [facility]    KNEE CARTILAGE SURGERY  36 yrs ago       Family History  Family History   Problem Relation Age of Onset    Heart Disease Father     Asthma Mother     Diabetes Paternal Grandmother        Social History  Social History     Socioeconomic History    Marital status:      Spouse name: Not on file    Number of children: Not on file    Years of education: Not on file    Highest education level: Not on file   Occupational History    Not on file   Social Needs    Financial resource strain: Not on file    Food insecurity:     Worry: Not on file     Inability: Not on file    Transportation needs:     Medical: Not on file     Non-medical: Not on file   Tobacco Use    Smoking status: Current Every Day Smoker     Packs/day: 0.50     Years: 40.00     Pack years: 20.00    Smokeless tobacco: Never Used   Substance and Sexual Activity    Alcohol use:  Yes    Drug use: No    Sexual activity: Not on file   Lifestyle    Physical activity:     Days per week: Not on file     Minutes per session: Not on file    Stress: Not on file   Relationships    Social connections:     Talks on phone: Not on file     Gets together: Not on file     Attends Mandaen service: Not on file     Active member of club or organization: Not on file     Attends meetings of clubs or organizations: Not on file     Relationship status: Not on file    Intimate partner violence:     Fear of current or ex partner: Not on file     Emotionally abused: Not on file     Physically abused: Not on file     Forced sexual activity: Not on file   Other Topics Concern    Not on hours.  Magnesium:No results for input(s): MG in the last 72 hours. Phosphorus:No results for input(s): PHOS in the last 72 hours. HgbA1C: No results for input(s): LABA1C in the last 72 hours. Hepatic:   Recent Labs     03/03/20  0422 03/03/20  0708   ALKPHOS 351* 292*   ALT 1,784* 1,458*   AST 2,111* 1,753*   PROT 5.7* 4.7*   BILITOT 6.0* 5.0*   LABALBU 3.6 3.0*     Lactic Acid:   Recent Labs     03/03/20  0436   LACTA 4.1*     Troponin: No results for input(s): CKTOTAL, CKMB, TROPONINT in the last 72 hours. ABGs: No results for input(s): PH, PCO2, PO2, HCO3, O2SAT in the last 72 hours. CRP:  No results for input(s): CRP in the last 72 hours. Sed Rate:  No results for input(s): SEDRATE in the last 72 hours. Cultures:   No results for input(s): CULTURE in the last 72 hours. No results for input(s): BC, [place] in the last 72 hours. No results for input(s): CXSURG in the last 72 hours. Radiology reports as per the Radiologist  Radiology: Ct Abdomen Pelvis Wo Contrast Additional Contrast? None    Result Date: 3/3/2020  CT ABDOMEN PELVIS WO CONTRAST 3/3/2020 5:15 AM HISTORY: Pain and vomiting. Coffee-ground emesis. COMPARISON: None DLP: 1078 mGy cm. Automated exposure control was utilized to diminish patient radiation dose. TECHNIQUE: Noncontrast enhanced images of the abdomen and pelvis obtained without oral contrast. FINDINGS: There is a 7.7 x 5.5 cm lobular mass within the right lower lobe with postobstructive right lower lobe pneumonia and a small right-sided effusion. This should be considered a neoplasm until otherwise proven with an infectious process also a differential. It appears the patient has undergone bronchoscopy. . The left lung base is clear. The base of the heart is unremarkable. There is some volume loss within the right lower lobe with mild mediastinal shift to the right. No evidence of pericardial effusion. Dena Car LIVER: There is mild hepatomegaly with steatosis of the liver. . The liver of mechanical bowel obstruction. 2. There is hepatomegaly. Liver is diffusely heterogeneous which is in part related to steatosis. Follow-up with enhanced CT imaging of the abdomen or ultrasound is recommended to more entirely exclude discrete hepatic lesions. 3. Punctate nonobstructing calculus lower pole calyx right kidney. No convincing evidence of obstructive uropathy although the ureters are difficult to trace secondary to the lack of intra-abdominal fat. 4. Lobular mass in the right lower lobe with a small right-sided effusion. It appears the patient has undergone recent bronchoscopy. Neoplasm should be excluded. . Signed by Dr Luzma Wong on 3/3/2020 7:02 AM    Xr Chest Portable    Result Date: 3/3/2020  Examination. XR CHEST PORTABLE 3/3/2020 7:00 AM History: Cough. A single, left anterior oblique, portable upright view of the chest is compared with the previous study dated 2/25/2020. The previously seen right infrahilar mass is now partly obscured by the cardiac silhouette in this oblique projection. There are some atelectatic changes in the right lower lung. The Left lung is clear. Heart size is not evaluated. There is no pulmonary congestion or pneumothorax. This left anterior oblique view of the chest is of limited diagnostic value and obscure the previously seen masslike density in the right lower lung. A repeat examination in PA projection may be obtained for optimal evaluation. Above findings are recorded on a digital voice clip in PACS.  Signed by Dr Luis Oviedo on 3/3/2020 8:14 AM       Assessment     Acute renal failure  Right lower lobe lung mass  Recent episode of hyponatremia  Lactic acidosis  Elevated LFTs-suspect shock liver  Hyperkalemia    Plan:  Discussed with nursing, GI, hospitalist  Again EKG much improved from admission  Given patient's constellation of medical findings including chronic dementia, she would not be an appropriate candidate for dialysis and we are attempting to

## 2020-03-03 NOTE — CONSULTS
Consult Note            Date:3/3/2020        Patient Name:Lori Wong     YOB: 1950     Age:69 y.o. Reason for Consult: Acute elevated liver function test with TASHIA hyperkalemia     Chief Complaint     Chief Complaint   Patient presents with    Hematemesis     per [facility] staff pt has been having \"coffee ground emesis\"      78-year-old female BMI less than 21 with lung mass dementia hypertension COPD, had recent bronchoscopy and a biopsy of the lung nodule. Brought in to the emergency room this morning with decompensated renal state which is new onset of TASHIA with hyperkalemia no nausea vomiting sepsis or any evidence of hypotensive spell, no overt icterus itching, patient is demented and DNR CC . Found to have elevated bilirubin of 5 AST ALT is over thousand, creatinine 3.4 INR 1.34 meld score over 20 . After bronchoscopy she is demented could not evaluate the amount of Tylenol for pain she has taken. History Obtained From   History obtained from the prior documentation and the nurse patient is in altered mental state alert but not obtunded    History of Present Illness   History of lung CA COPD at 7.7 cm lung tumor had bronchoscopy and a biopsy to evaluate the neoplastic stages hypertension RLL osteoporosis    Past Medical History     Past Medical History:   Diagnosis Date    COPD (ch[facility] obstructive pulmonary disease) ([facility] [address]     H/O removal of neck cyst     Hypertension     Lung nodule     RLL    Osteoporosis         Past Surgical History     Past Surgical History:   Procedure Laterality Date    BLEPHAROPLASTY      BRANCHIAL CYST EXCISION Right 09/30/2016    jared correia     BRONCHOSCOPY N/A 1/17/2020    BRONCHOSCOPY performed by Madai Lim MD at [facility]    KNEE CARTILAGE SURGERY  40 yrs ago        Medications     Prior to Admission medications    Medication Sig Start Date End Date Taking?  Authorizing Provider   ibuprofen (ADVIL;MOTRIN) 800 MG tablet Take 800 mg by mouth every 6 hours as needed for Pain   Yes Historical Provider, MD   levoFLOXacin (LEVAQUIN) 500 MG tablet Take 500 mg by mouth daily 2/27/20 3/9/20 Yes Historical Provider, MD   potassium chloride (KLOR-CON M) 20 MEQ extended release tablet Take 20 mEq by mouth 2 times daily   Yes Historical Provider, MD   promethazine (PHENERGAN) 12.5 MG tablet Take 12.5 mg by mouth every 6 hours as needed for Nausea   Yes Historical Provider, MD   lisinopril (PRINIVIL;ZESTRIL) 10 MG tablet Take 1 tablet by mouth daily 12/18/19  Yes Mario Hernandez MD   Multiple Vitamins-Minerals (MULTIVITAMIN PO) Take by mouth daily    Yes Historical Provider, MD   HYDROcodone-acetaminophen (NORCO) 5-325 MG per tablet Take 1 tablet by mouth every 6 hours as needed for Pain for up to 30 days.  3/2/20 4/1/20  Mario Hernandez MD   Nebulizers [employer]mnik[employer] COMPACT MINI NEBULIZER) MISC Qid 12/17/19   Mario Hernandez MD   albuterol (PROVENTIL) (2.5 MG/3ML) 0.083% nebulizer solution Take 3 mLs by nebulization every 6 hours as needed for Wheezing 12/17/19   Mario Hernandez MD   denosumab (PROLIA) 60 MG/ML SOSY SC injection Inject 1 mL into the skin once for 1 dose  Patient taking differently: Inject 60 mg into the skin every 6 months  10/30/19 10/30/19  Mario Hernandez MD   albuterol sulfate HFA (PROAIR HFA) 108 (90 BASE) MCG/ACT inhaler Inhale 2 puffs into the lungs every 6 hours as needed for Wheezing 12/20/16   Mario Hernandez MD        dextrose 50 % IV solution, PRN  sodium chloride flush 0.9 % injection 10 mL, 2 times per day  sodium chloride flush 0.9 % injection 10 mL, PRN  acetaminophen (TYLENOL) tablet 650 mg, Q6H PRN    Or  acetaminophen (TYLENOL) suppository 650 mg, Q6H PRN  magnesium hydroxide (MILK OF MAGNESIA) 400 MG/5ML suspension 30 mL, Daily PRN  promethazine (PHENERGAN) tablet 12.5 mg, Q6H PRN    Or  ondansetron (ZOFRAN) injection 4 mg, Q6H PRN  sodium bicarbonate 50 mEq in sodium chloride 0.45 % 1,000 mL infusion, Continuous  potassium chloride (KLOR-CON M) extended release tablet 40 mEq, PRN    Or  potassium bicarb-citric acid (EFFER-K) effervescent tablet 40 mEq, PRN    Or  potassium chloride 10 mEq/100 mL IVPB (Peripheral Line), PRN  potassium chloride 10 mEq/100 mL IVPB (Peripheral Line), PRN  magnesium sulfate 2 g in 50 mL IVPB premix, PRN  calcium gluconate 10 % injection 1 g, Once  sodium polystyrene (KAYEXALATE) 15 GM/60ML suspension 30 g, Q6H  pantoprazole (PROTONIX) injection 40 mg, BID    And  sodium chloride (PF) 0.9 % injection 10 mL, BID  meropenem (MERREM) 500 mg IVPB (mini-bag), Q12H  acetylcysteine (ACETADOTE) 7,420 mg in dextrose 5 % 200 mL infusion, Once    Followed by  acetylcysteine (ACETADOTE) 2,480 mg in dextrose 5 % 500 mL infusion, Once    Followed by  acetylcysteine (ACETADOTE) 4,940 mg in dextrose 5 % 1,000 mL infusion, Once        Allergies   Influenza vaccines    Social History     Social History     Tobacco History     Smoking Status  Current Every Day Smoker Smoking Frequency  0.5 packs/day for 40 years (20 pk yrs)    Smokeless Tobacco Use  Never Used          Alcohol History     Alcohol Use Status  Yes          Drug Use     Drug Use Status  No          Sexual Activity     Sexually Active  Not Asked                Family History     Family History   Problem Relation Age of Onset    Heart Disease Father     Asthma Mother     Diabetes Paternal Grandmother        Review of Systems   Review of Systems   Constitutional: Positive for unexpected weight change. HENT: Negative. Eyes: Positive for redness. Respiratory: Positive for shortness of breath. Cardiovascular: Positive for palpitations. Gastrointestinal: Positive for constipation. Endocrine: Positive for cold intolerance. Genitourinary: Negative. Musculoskeletal: Positive for myalgias. Skin: Positive for pallor. Neurological: Positive for speech difficulty. Hematological: Bruises/bleeds easily. Psychiatric/Behavioral: Positive for confusion.         Physical Exam   /74   Pulse 118   Temp 98.1 °F (36.7 °C) (Temporal)   Resp 21   Ht 5' 1\" (1.549 m)   Wt 109 lb (49.4 kg)   SpO2 94%   BMI 20.60 kg/m²      Physical Exam  Constitutional:       General: She is in acute distress. Appearance: She is ill-appearing. HENT:      Head: Normocephalic and atraumatic. Nose: Nose normal.      Mouth/Throat:      Mouth: Mucous membranes are dry. Eyes:      Extraocular Movements: Extraocular movements intact. Pupils: Pupils are equal, round, and reactive to light. Cardiovascular:      Rate and Rhythm: Tachycardia present. Heart sounds: Murmur present. Pulmonary:      Breath sounds: Wheezing present. Abdominal:      General: Abdomen is flat. Palpations: Abdomen is soft. Musculoskeletal: Normal range of motion. Skin:     General: Skin is warm. Findings: Bruising and erythema present. Neurological:      Mental Status: She is alert. She is disoriented. Labs    CBC:  Recent Labs     03/03/20 0422   WBC 12.3*   RBC 3.92*   HGB 12.7   HCT 38.9   MCV 99.2*   RDW 16.0*   PLT 39*     CHEMISTRIES:  Recent Labs     03/03/20 0422 03/03/20  0708    139   K 7.4* 7.0*   CL 98 103   CO2 17* 17*   * 109*   CREATININE 3.8* 3.4*   GLUCOSE 112* 157*     PT/INR:  Recent Labs     03/03/20 0422   PROTIME 16.5*   INR 1.33*     APTT:  Recent Labs     03/03/20 0422   APTT 26.6     LIVER PROFILE:  Recent Labs     03/03/20 0422 03/03/20  0708   AST 2,111* 1,753*   ALT 1,784* 1,458*   BILITOT 6.0* 5.0*   ALKPHOS 351* 292*       Imaging/Diagnostics   Ct Abdomen Pelvis Wo Contrast Additional Contrast? None    Result Date: 3/3/2020  1. There is mild constipation. There is some mild fecal impaction within the rectal vault. Perirectal stranding as well as some stranding within the presacral space is present which are nonspecific findings. There is some free fluid within the pelvis and right paracolic gutter with mild associated stranding. This is also a nonspecific finding. No evidence of mechanical bowel obstruction. 2. There is hepatomegaly. Liver is diffusely heterogeneous which is in part related to steatosis. Follow-up with enhanced CT imaging of the abdomen or ultrasound is recommended to more entirely exclude discrete hepatic lesions. 3. Punctate nonobstructing calculus lower pole calyx right kidney. No convincing evidence of obstructive uropathy although the ureters are difficult to trace secondary to the lack of intra-abdominal fat. 4. Lobular mass in the right lower lobe with a small right-sided effusion. It appears the patient has undergone recent bronchoscopy. Neoplasm should be excluded. . Signed by Dr Fadi Arriaga on 3/3/2020 7:02 AM    Xr Chest Portable    Result Date: 3/3/2020  This left anterior oblique view of the chest is of limited diagnostic value and obscure the previously seen masslike density in the right lower lung. A repeat examination in PA projection may be obtained for optimal evaluation. Above findings are recorded on a digital voice clip in PACS. Signed by Dr Janice Andre on 3/3/2020 8:14 AM    Xr Chest Portable    Result Date: 2/25/2020  1. No acute findings or significant change. 2.  Redemonstrated masslike opacity in the RIGHT lower lobe present on prior chest CT with differential including allergic bronchopulmonary aspergillosis and less likely, neoplasm. Signed by Dr Damion Burnham on 2/25/2020 4:47 PM      Assessment    1. Acute liver failure meld score over 20   Acute renal insufficiency with hyperkalemia  3. Recent biopsy of lung mass 7.7 cm with bronchoscopy  4. Altered mental status with patient is demented  5. Unquantified acetaminophen ingestion perhaps for pain after biopsy   6.   Possible hypoxic changes after a biopsy or possible hypotension from blood loss that promotes acute kidney injury and hypoxic hepatopathy , abrupt rise of AST ALT with disproportionately rising bilirubin possible hypoxic or

## 2020-03-03 NOTE — ED NOTES
Called Dr Nathen Blake @ [phone] @ [facility] [address]     Violette Hobbs  03/03/20 7871

## 2020-03-03 NOTE — ED PROVIDER NOTES
[facility] EMERGENCY DEPT  eMERGENCY dEPARTMENT eNCOUnter      Pt Name: Mainor Arriaza  MRN: [id number]  [place] 1950  Date of evaluation: 3/3/2020  Provider: Freddy Mercedes MD    CHIEF COMPLAINT       Chief Complaint   Patient presents with    Hematemesis     per [facility] staff pt has been having \"coffee ground emesis\"         HISTORY OF PRESENT ILLNESS   (Location/Symptom, Timing/Onset,Context/Setting, Quality, Duration, Modifying Factors, Severity)  Note limiting factors. Mainor Arriaza is a 78 y.o. female who presents to the emergency department from [address] [place] [address] after episode of vomiting at the nursing home staff described as coffee ground. On arrival to the ED patient is alert, opens her eyes to voice. She is not really able to provide very much history. She does complain of some moderate severity abdominal pain. States that she does not think she has been having any black tarry stools. Is unsure how many times she may have vomited. History is difficult to be obtained from patient secondary to her dementia. In review of her records it does not appear she is maintained on any anticoagulant medications. HPI    NursingNotes were reviewed. REVIEW OF SYSTEMS    (2-9 systems for level 4, 10 or more for level 5)     Review of Systems   Gastrointestinal: Positive for abdominal pain and vomiting. Significantly limited ability to obtain review of systems secondary to patient's dementia.       PAST MEDICALHISTORY     Past Medical History:   Diagnosis Date    COPD [facility]ch[facility] disease) ([facility] [address]     H/O removal of neck cyst     Hypertension     Lung nodule     RLL    Osteoporosis          SURGICAL HISTORY       Past Surgical History:   Procedure Laterality Date    BLEPHAROPLASTY      BRANCHIAL CYST EXCISION Right 09/30/2016    jared correia     BRONCHOSCOPY N/A 1/17/2020    BRONCHOSCOPY performed by June Lombard, MD at [address] [facility]    KNEE CARTILAGE SURGERY  40 yrs ago  Drug use: No    Sexual activity: None   Lifestyle    Physical activity:     Days per week: None     Minutes per session: None    Stress: None   Relationships    Social connections:     Talks on phone: None     Gets together: None     Attends Restoration service: None     Active member of club or organization: None     Attends meetings of clubs or organizations: None     Relationship status: None    Intimate partner violence:     Fear of current or ex partner: None     Emotionally abused: None     Physically abused: None     Forced sexual activity: None   Other Topics Concern    None   Social History Narrative    None       SCREENINGS    Kress Coma Scale  Eye Opening: Spontaneous  Best Verbal Response: Confused  Best Motor Response: Localizes pain  Shekhar Coma Scale Score: 13        PHYSICAL EXAM    (up to 7 for level 4, 8 or more for level 5)     ED Triage Vitals [03/03/20 0419]   BP Temp Temp Source Pulse Resp SpO2 Height Weight   109/73 97.5 °F (36.4 °C) Temporal 115 19 90 % -- 105 lb (47.6 kg)       Physical Exam  Vitals signs and nursing note reviewed. HENT:      Head: Atraumatic. Mouth/Throat:      Mouth: Mucous membranes are dry. Pharynx: No posterior oropharyngeal erythema. Eyes:      General: No scleral icterus. Pupils: Pupils are equal, round, and reactive to light. Neck:      Trachea: No tracheal deviation. Cardiovascular:      Rate and Rhythm: Normal rate and regular rhythm. Pulses: Normal pulses. Heart sounds: Normal heart sounds. No murmur. Pulmonary:      Effort: Pulmonary effort is normal. No respiratory distress. Breath sounds: Normal breath sounds. No stridor. Abdominal:      General: There is distension (mild). Palpations: Abdomen is soft. Tenderness: There is abdominal tenderness (moderate, diffuse). There is no guarding. Genitourinary:     Rectum: Guaiac result negative. Musculoskeletal:      Right lower leg: No edema.       Left Neutrophils Absolute 10.0 (*)     Lymphocytes Absolute 0.7 (*)     Anisocytosis 1+ (*)     All other components within normal limits   COMPREHENSIVE METABOLIC PANEL W/ REFLEX TO MG FOR LOW K - Abnormal; Notable for the following components:    Potassium reflex Magnesium 7.4 (*)     CO2 17 (*)     Anion Gap 22 (*)     Glucose 112 (*)      (*)     CREATININE 3.8 (*)     GFR Non- 12 (*)     Total Protein 5.7 (*)     Total Bilirubin 6.0 (*)     Alkaline Phosphatase 351 (*)     ALT 1,784 (*)     AST 2,111 (*)     All other components within normal limits    Narrative:     CALL  Solo  KLED tel. ,  Chemistry results called to and read back by JOSE DE JESUS [facility] ED,  03/03/2020 05:20, by [facility]   LIPASE - Abnormal; Notable for the following components:    Lipase 227 (*)     All other components within normal limits   TROPONIN - Abnormal; Notable for the following components:    Troponin 0.04 (*)     All other components within normal limits   PROTIME-INR - Abnormal; Notable for the following components:    Protime 16.5 (*)     INR 1.33 (*)     All other components within normal limits   LACTIC ACID, PLASMA - Abnormal; Notable for the following components:    Lactic Acid 4.1 (*)     All other components within normal limits    Narrative:     CALL  Solo  KLED tel. ,  Chemistry results called to and read back by JOSE DE JESUS [facility] ED,  03/03/2020 05:20, by [facility]   APTT   COMPREHENSIVE METABOLIC PANEL   CBC WITH AUTO DIFFERENTIAL   URINE RT REFLEX TO CULTURE   TYPE AND SCREEN       All other labs were within normal range or not returned as of this dictation.     EMERGENCY DEPARTMENT COURSE and DIFFERENTIAL DIAGNOSIS/MDM:   Vitals:    Vitals:    03/03/20 0501 03/03/20 0507 03/03/20 0531 03/03/20 0601   BP: 116/72  111/69 118/74   Pulse: 106 120 99 109   Resp: 22  22 22   Temp:       TempSrc:       SpO2: 93%  92% 93%   Weight:           MDM    Reassessment    Patient stool is Hemoccult negative and her hemoglobin is stable. CT scan of her abdomen does not demonstrate any acute intra-abdominal pathology. There is a large mass identified in the right lower lobe of the lung. She does have a known prior history of this and is underwent previous biopsy by pulmonology. Patient's laboratory studies reveal evidence of acute kidney injury with serum creatinine noted at 3.8, . Her serum potassium is 7.4. Also noted to have significantly elevated liver enzymes with ALT at 174, AST 2111, serum bilirubin 6.0. Her lipase was also elevated at 227. Receiving IV fluids along with IV insulin, D50, calcium gluconate and Kayexalate here in the ED. Her EKG does not demonstrate any evidence of arrhythmia. There are some peaked T waves identified. CONSULTS:    Case was discussed with Dr. Jacqueline Harrison regardign inpatient admission to the ICU. Case was discussed with Chelsea Frank regarding Nephrology consultation. PROCEDURES:  Unless otherwise noted below, none     Procedures     CRITICAL CARE TIME   Total Critical Care time was 44 minutes, excluding separately reportable procedures. There was a high probability of clinically significant/life threatening deterioration in the patient's condition which required my urgent intervention. FINAL IMPRESSION      1. Acute kidney injury ([address]    2. Hyperkalemia    3. Generalized abdominal pain    4. Elevated liver enzymes    5.  Right lower lobe lung mass          DISPOSITION/PLAN   DISPOSITION Decision To Admit 03/03/2020 06:52:51 AM      (Please note that portions of this note were completed with a voice recognition program.  Efforts were made to edit thedictations but occasionally words are mis-transcribed.)    Troy Rosas MD (electronically signed)  Attending Emergency Physician         Troy Rosas MD  03/03/20 1211       Troy Rosas MD  03/03/20 1630

## 2020-03-03 NOTE — H&P
[address] [facility] - History & Physical      PCP: Zahira Gonzalez MD    Date of Admission: 3/3/2020    Date of Service: 3/3/2020    Chief Complaint:  Presented from NH with N/V abdominal pain hematemesis, found to have TASHIA, lactic acidosis, hyperkalemia with peaked T wave     History Of Present Illness: The patient is a 78 y.o. female NH patient with PMH of dementia, HTN, COPD RLL mass being followed by pulmonary , smoking  who presents to the emergency department from [address] [place] [address] after episode of vomiting at the nursing home staff described as coffee ground, very poor historian but  complains of  abdominal pain, in ER was found to have CR 3.8, K 7.4, with peaked T wave, LFTs elevated , bilirubin elevated, lipase 227, lactic acid 4.1,UA +ve, CT abdomen constipation, no hydro,perirectal stranding, hepatomegaly     Past Medical History:        Diagnosis Date    COPD (chronic obstructive pulmonary disease) ([address]     H/O removal of neck cyst     Hypertension     Lung nodule     RLL    Osteoporosis        Past Surgical History:        Procedure Laterality Date    BLEPHAROPLASTY      BRANCHIAL CYST EXCISION Right 09/30/2016    jared correia     BRONCHOSCOPY N/A 1/17/2020    BRONCHOSCOPY performed by Terry Orlando MD at [facility]    KNEE CARTILAGE SURGERY  40 yrs ago       Home Medications:  Prior to Admission medications    Medication Sig Start Date End Date Taking?  Authorizing Provider   ibuprofen (ADVIL;MOTRIN) 800 MG tablet Take 800 mg by mouth every 6 hours as needed for Pain   Yes Historical Provider, MD   levoFLOXacin (LEVAQUIN) 500 MG tablet Take 500 mg by mouth daily 2/27/20 3/9/20 Yes Historical Provider, MD   potassium chloride (KLOR-CON M) 20 MEQ extended release tablet Take 20 mEq by mouth 2 times daily   Yes Historical Provider, MD   promethazine (PHENERGAN) 12.5 MG tablet Take 12.5 mg by mouth every 6 hours as needed for Nausea   Yes Historical Provider, MD   lisinopril (PRINIVIL;ZESTRIL) 10 MG tablet Take 1 tablet by mouth daily 12/18/19  Yes Kalina Diaz MD   Multiple Vitamins-Minerals (MULTIVITAMIN PO) Take by mouth daily    Yes Historical Provider, MD   HYDROcodone-acetaminophen (NORCO) 5-325 MG per tablet Take 1 tablet by mouth every 6 hours as needed for Pain for up to 30 days. 3/2/20 4/1/20  Kalina Diaz MD   Nebulizers [employer] COMPACT MINI NEBULIZER) MISC Qid 12/17/19   Kalina Diaz MD   albuterol (PROVENTIL) (2.5 MG/3ML) 0.083% nebulizer solution Take 3 mLs by nebulization every 6 hours as needed for Wheezing 12/17/19   Kalina Diaz MD   denosumab (PROLIA) 60 MG/ML SOSY SC injection Inject 1 mL into the skin once for 1 dose  Patient taking differently: Inject 60 mg into the skin every 6 months  10/30/19 10/30/19  Kalina Diaz MD   albuterol sulfate HFA (PROAIR HFA) 108 (90 BASE) MCG/ACT inhaler Inhale 2 puffs into the lungs every 6 hours as needed for Wheezing 12/20/16   Kalina Diaz MD       Allergies:    Influenza vaccines    Social History:      Tobacco:   reports that she has been smoking. She has a 20.00 pack-year smoking history. She has never used smokeless tobacco.  Alcohol:   reports current alcohol use.   Illicit Drugs: no     Family History:      Problem Relation Age of Onset    Heart Disease Father     Asthma Mother     Diabetes Paternal Grandmother        Review of Systems:   Constitutional / general:  No fever / chills / sweats  HEENT: No sore throat / hoarseness / vision changes  CV:  No chest pain / palpitations/ orthopnea   Respiratory:  No cough / shortness of breath / sputum / hemoptysis  :  No dysuria / hesitancy / urgency / hematuria   Neuro: No muscle weakness / dysphagia / headache / paresthesias  Musculoskeletal:  No edema / cyanosis / pain  Psychiatric:  No depression / anxiety / insomnia  Skin:  No new rashes / lesions    Physical Examination:        /74   Pulse 109   Temp 97.5 °F (36.4 °C) (Temporal)   Resp 22   Wt 105 lb (47.6 kg) lobe with a small right-sided   effusion. It appears the patient has undergone recent bronchoscopy. Neoplasm should be excluded. .    Signed by Dr Melany Barbosa on 3/3/2020 7:02 AM      CT Head WO Contrast    (Results Pending)     CBC:  Recent Labs     03/03/20 0422   WBC 12.3*   HGB 12.7   HCT 38.9   PLT 39*     BMP:  Recent Labs     03/03/20 0422      K 7.4*   CL 98   CO2 17*   *   CREATININE 3.8*   CALCIUM 9.7     Recent Labs     03/03/20 0422   AST 2,111*   ALT 1,784*   BILITOT 6.0*   ALKPHOS 351*     Coag Panel:   Recent Labs     03/03/20 0422   INR 1.33*   PROTIME 16.5*   APTT 26.6     Cardiac Enzymes:   Recent Labs     03/03/20 0422   TROPONINI 0.04*     ABGs:No results found for: Francoise Curb, [id number]  Urinalysis:  Lab Results   Component Value Date    NITRU POSITIVE 03/03/2020    WBCUA 11-15 02/25/2020    RBCUA 2-4 02/25/2020    BLOODU Negative 03/03/2020    SPECGRAV 1.019 03/03/2020    GLUCOSEU Negative 03/03/2020       Active Hospital Problems    Diagnosis Date Noted    Hyperkalemia [E87.5] 03/03/2020       Assessment and Plan: Active Problems:    Hyperkalemia  Resolved Problems:    * No resolved hospital problems. *    Admit, nephrology on board, kayxalate, albuterol , HCO3 gtt, calcium gluconate, repeat CMP  Elevated LFTs , hepatomegaly check acute hepatitis panel, trend, US liver   RLL mass s/p bronchoscopy  TASHIA: IVF, hold ACEI, NSAIDS, nephrology on board  Lactic acidosis: IVF, repeat  ? Hematemesis, HB stable, noted thrombocytopenia, GI consult trend   UTI?  Pneumonia, started merrem  Code status was DNR in previous admission but daughter stated wants full code   DVT prophylaxis with SCD           Kevon Mg  Hospitalist service  3/3/2020  7:41 AM

## 2020-03-04 NOTE — CONSULTS
been smoking. She has a 20.00 pack-year smoking history. She has never used smokeless tobacco.  ETOH:   reports current alcohol use.   Patient currently lives in a nursing home    Family History:       Problem Relation Age of Onset    Heart Disease Father     Asthma Mother     Diabetes Paternal Grandmother        Palliative Performance Score: 30%    Review of Systems   Unable to obtain due to acuity of condition, patient denies pain at this time    Palliative Review of Advance Directives:     Surrogate Decision Maker:yes    Durable Power of :yes    Advanced Directives/Living Bran: yes    Out of hospital medical orders in place to reflect resuscitation status (MOLST/POLST): no    Information Sharing:  Patient's awareness of illness:  [] Terminal [] Life-Threatening [x] Serious [] Non life-threatening [] Not serious   [] Not discussed    Family awareness of illness:   [] Terminal [] Life-Threatening [x] Serious [] Nonlife-threatening [] Not serious   [] Not discussed    DIET GENERAL;    Medications:   IV infusion builder       Current Facility-Administered Medications   Medication Dose Route Frequency Provider Last Rate Last Dose    sodium bicarbonate 150 mEq in dextrose 5 % 1,000 mL infusion   Intravenous Continuous Pilo Garcia DO        dextrose 50 % IV solution  25 g Intravenous PRN Alona Trejo MD   25 g at 03/03/20 0610    sodium chloride flush 0.9 % injection 10 mL  10 mL Intravenous 2 times per day Vanessa Addison MD   10 mL at 03/03/20 2100    sodium chloride flush 0.9 % injection 10 mL  10 mL Intravenous PRN Vanessa Addison MD        magnesium hydroxide (MILK OF MAGNESIA) 400 MG/5ML suspension 30 mL  30 mL Oral Daily PRN Vanessa Addison MD        promethazine (PHENERGAN) tablet 12.5 mg  12.5 mg Oral Q6H PRN Vanessa Addison MD        Or    ondansetron (ZOFRAN) injection 4 mg  4 mg Intravenous Q6H PRN Vanessa Addison MD   4 mg at 03/03/20 2112    potassium chloride (KLOR-CON M) extended release tablet 40 mEq  40 mEq Oral PRN Jennifer Roberto MD        Or    potassium bicarb-citric acid (EFFER-K) effervescent tablet 40 mEq  40 mEq Oral PRN Jennifer Roberto MD        Or   Bedelia Fruits potassium chloride 10 mEq/100 mL IVPB (Peripheral Line)  10 mEq Intravenous PRN Jennifer Roberto MD        potassium chloride 10 mEq/100 mL IVPB (Peripheral Line)  10 mEq Intravenous PRN Jennifer Roberto MD        magnesium sulfate 2 g in 50 mL IVPB premix  2 g Intravenous PRN Jennifer Roberto MD        calcium gluconate 10 % injection 1 g  1 g Intravenous Once Jennifer Roberto MD        pantoprazole (PROTONIX) injection 40 mg  40 mg Intravenous BID Jennifer Roberto MD   40 mg at 03/03/20 2100    And    sodium chloride (PF) 0.9 % injection 10 mL  10 mL Intravenous BID Jennifer Roberto MD   10 mL at 03/03/20 2100    acetylcysteine (ACETADOTE) 4,940 mg in dextrose 5 % 1,000 mL infusion  100 mg/kg Intravenous Once Jasmine Mccray MD 64 mL/hr at 03/03/20 1700 4,940 mg at 03/03/20 1700        Labs:     Recent Labs     03/03/20  0422 03/03/20  1012 03/04/20  0035   WBC 12.3* 10.1 10.2   RBC 3.92* 3.62* 3.14*   HGB 12.7 11.8* 10.4*   HCT 38.9 35.6* 31.5*   MCV 99.2* 98.3 100.3*   MCH 32.4* 32.6* 33.1*   MCHC 32.6* 33.1 33.0   PLT 39* 31* 25*     Recent Labs     03/03/20  1807 03/04/20  0035 03/04/20  0606    136 137   K 4.7 4.3 4.3   ANIONGAP 21* 24* 23*   CL 99 96* 98   CO2 17* 16* 16*   * 105* 105*   CREATININE 3.3* 3.3* 3.5*   GLUCOSE 165* 176* 164*   CALCIUM 7.8* 7.6* 7.5*     Recent Labs     03/04/20  0035   MG 2.1   PHOS 3.4     Recent Labs     03/03/20  0422 03/03/20  0708 03/04/20  0035   AST 2,111* 1,753* 1,639*   ALT 1,784* 1,458* 1,382*   BILITOT 6.0* 5.0* 5.6*   ALKPHOS 351* 292* 301*     ABGs:No results for input(s): PHART, HUG3JSH, PO2ART, FCE6JPI, BEART, HGBAE, D0HCEUQB, CARBOXHGBART, 02THERAPY in the last 72 hours. HgBA1c: No results for input(s): LABA1C in the last 72 hours.   FLP:    Lab Results   Component Value Date TRIG 77 11/01/2019    HDL 98 11/01/2019    LDLCALC 76 11/01/2019     TSH:    Lab Results   Component Value Date    TSH 2.380 11/01/2019     Troponin T:   Recent Labs     03/03/20 0422   TROPONINI 0.04*     INR:   Recent Labs     03/03/20 0422   INR 1.33*       Objective:   Vitals: BP (!) 108/54   Pulse 104   Temp 98.3 °F (36.8 °C)   Resp 23   Ht 5' 1\" (1.549 m)   Wt 112 lb (50.8 kg)   SpO2 91%   BMI 21.16 kg/m²   24HR INTAKE/OUTPUT:      Intake/Output Summary (Last 24 hours) at 3/4/2020 0813  Last data filed at 3/4/2020 0545  Gross per 24 hour   Intake 6127 ml   Output 640 ml   Net 5487 ml     Physical Exam      General appearance: alert but drowsy, cooperative with exam, appears ill, no acute distress  HEENT: atraumatic, eyes with normal lids, pupils and irises normal, external ears and nose are normal,lips normal.  Neck: without masses, lymphadenopathy, supple  Lungs: no increased work of breathing, clear to auscultation bilaterally  Heart: regular rate and rhythm and S1, S2 normal  Abdomen: soft, non-tender; bowel sounds normal; no masses,  no organomegaly  Genitourinary: No bladder fullness, masses, or tenderness  Extremities: extremities normal, atraumatic, no cyanosis or edema  Neurologic: Lays with eyes closed but alert to self, responds to questions, able to state current situation, place, and city  Psychiatric: no agitation or anxiety  Skin: warm, dry, bruising noted to BUE    Assessment and Plan: Active Problems:    Hyperkalemia  Resolved Problems:    * No resolved hospital problems. *      Visit Summary: I saw the patient at the bedside with her stepdaughters present. Patient is slightly more alert today and able to answer some orientation questions appropriately. She does mainly lay with her eyes closed but is able to express her needs, currently denies discomfort.   Patient's stepdaughters have expressed that they would like to move toward hospice care and would want patient to be DNR but

## 2020-03-04 NOTE — PROGRESS NOTES
Progress Note    Date:3/4/2020       Room:0141/141-01  Patient Name:Lori Ariza     YOB: 1950     Age:69 y.o. Subjective     Acute liver failure meld over 25 , possible Tylenol toxicity or hypoxic hepatopathy      Review of Systems       Medications   Scheduled Meds:    sodium chloride flush  10 mL Intravenous 2 times per day    calcium gluconate  1 g Intravenous Once    pantoprazole  40 mg Intravenous BID    And    sodium chloride (PF)  10 mL Intravenous BID     Continuous Infusions:    IV infusion builder 100 mL/hr at 20 1031    acetylcysteine (ACETADOTE) infusion *third dose* 4,940 mg (20 1032)     PRN Meds: dextrose, sodium chloride flush, magnesium hydroxide, promethazine **OR** ondansetron, potassium chloride **OR** potassium alternative oral replacement **OR** potassium chloride, potassium chloride, magnesium sulfate    Past History    Past Medical History:   has a past medical history of COPD (chronic obstructive pulmonary disease) ([address] H/O removal of neck cyst, Hypertension, Lung nodule, and Osteoporosis. Social History:   reports that she has been smoking. She has a 20.00 pack-year smoking history. She has never used smokeless tobacco. She reports current alcohol use. She reports that she does not use drugs. Family History:   Family History   Problem Relation Age of Onset    Heart Disease Father     Asthma Mother     Diabetes Paternal Grandmother        Physical Examination      Vitals:  /67   Pulse 111   Temp 96.7 °F (35.9 °C) (Temporal)   Resp 25   Ht 5' 1\" (1.549 m)   Wt 112 lb (50.8 kg)   SpO2 95%   BMI 21.16 kg/m²   Temp (24hrs), Av.1 °F (36.7 °C), Min:96.7 °F (35.9 °C), Max:98.3 °F (36.8 °C)      I/O (24Hr): Intake/Output Summary (Last 24 hours) at 3/4/2020 1046  Last data filed at 3/4/2020 0800  Gross per 24 hour   Intake 6127 ml   Output 665 ml   Net 5462 ml       Physical Exam  Vitals signs and nursing note reviewed.  Exam fluid within the pelvis. Mild presacral thickening is present as well some thickening of the perirectal fat. . The osseous structures and soft tissues demonstrate no worrisome lesions. No evidence of a ventral wall hernia. PELVIS: The uterus and adnexa are unremarkable. There is some free fluid in the cul-de-sac as well as presacral thickening. There is mild fecal impaction within the rectal vault. . The urinary bladder is normal in appearance. 1. There is mild constipation. There is some mild fecal impaction within the rectal vault. Perirectal stranding as well as some stranding within the presacral space is present which are nonspecific findings. There is some free fluid within the pelvis and right paracolic gutter with mild associated stranding. This is also a nonspecific finding. No evidence of mechanical bowel obstruction. 2. There is hepatomegaly. Liver is diffusely heterogeneous which is in part related to steatosis. Follow-up with enhanced CT imaging of the abdomen or ultrasound is recommended to more entirely exclude discrete hepatic lesions. 3. Punctate nonobstructing calculus lower pole calyx right kidney. No convincing evidence of obstructive uropathy although the ureters are difficult to trace secondary to the lack of intra-abdominal fat. 4. Lobular mass in the right lower lobe with a small right-sided effusion. It appears the patient has undergone recent bronchoscopy. Neoplasm should be excluded. . Signed by Dr Todd Alvares on 3/3/2020 7:02 AM    Xr Chest Portable    Result Date: 3/3/2020  Examination. XR CHEST PORTABLE 3/3/2020 7:00 AM History: Cough. A single, left anterior oblique, portable upright view of the chest is compared with the previous study dated 2/25/2020. The previously seen right infrahilar mass is now partly obscured by the cardiac silhouette in this oblique projection. There are some atelectatic changes in the right lower lung. The Left lung is clear. Heart size is not evaluated.

## 2020-03-04 NOTE — PROGRESS NOTES
potassium bicarb-citric acid (EFFER-K) effervescent tablet 40 mEq  40 mEq Oral PRN Marcus Montes De Oca MD        Or    potassium chloride 10 mEq/100 mL IVPB (Peripheral Line)  10 mEq Intravenous PRN Marcus Montes De Oca MD        potassium chloride 10 mEq/100 mL IVPB (Peripheral Line)  10 mEq Intravenous PRN Marcus Montes De Oca MD        magnesium sulfate 2 g in 50 mL IVPB premix  2 g Intravenous PRN Marcus Montes De Oca MD        calcium gluconate 10 % injection 1 g  1 g Intravenous Once Marcus Montes De Oca MD        sodium polystyrene (KAYEXALATE) 15 GM/60ML suspension 30 g  30 g Oral Q6H Marcus Montes De Oca MD   30 g at 03/04/20 0406    pantoprazole (PROTONIX) injection 40 mg  40 mg Intravenous BID Marcus Montes De Oca MD   40 mg at 03/03/20 2100    And    sodium chloride (PF) 0.9 % injection 10 mL  10 mL Intravenous BID Marcus Montes De Oca MD   10 mL at 03/03/20 2100    meropenem (MERREM) 500 mg IVPB (mini-bag)  500 mg Intravenous Q12H Marcus Montes De Oca MD   Stopped at 03/03/20 2230    acetylcysteine (ACETADOTE) 4,940 mg in dextrose 5 % 1,000 mL infusion  100 mg/kg Intravenous Once [place] MD Birdie 64 mL/hr at 03/03/20 1700 4,940 mg at 03/03/20 1700       Past Medical History:  Past Medical History:   Diagnosis Date    [address] (chronic obstructive pulmonary disease) [address] [address]     H/O removal of neck cyst     Hypertension     Lung nodule     RLL    Osteoporosis        Past Surgical History:  Past Surgical History:   Procedure Laterality Date    BLEPHAROPLASTY      BRANCHIAL CYST EXCISION Right 09/30/2016    jared correia     BRONCHOSCOPY N/A 1/17/2020    BRONCHOSCOPY performed by Davion Haskins [facility] at [facility]    KNEE CARTILAGE SURGERY  36 yrs ago       Family History  Family History   Problem Relation Age of Onset    Heart Disease Father     Asthma Mother     Diabetes Paternal Grandmother        Social History  Social History     Socioeconomic History    Marital status:      Spouse name: Not on file    Number of children: Not on file    Years of education: Not on file    Highest education level: Not on file   Occupational History    Not on file   Social Needs    Financial resource strain: Not on file    Food insecurity:     Worry: Not on file     Inability: Not on file    Transportation needs:     Medical: Not on file     Non-medical: Not on file   Tobacco Use    Smoking status: Current Every Day Smoker     Packs/day: 0.50     Years: 40.00     Pack years: 20.00    Smokeless tobacco: Never Used   Substance and Sexual Activity    Alcohol use: Yes    Drug use: No    Sexual activity: Not on file   Lifestyle    Physical activity:     Days per week: Not on file     Minutes per session: Not on file    Stress: Not on file   Relationships    Social connections:     Talks on phone: Not on file     Gets together: Not on file     Attends Hindu service: Not on file     Active member of club or organization: Not on file     Attends meetings of clubs or organizations: Not on file     Relationship status: Not on file    Intimate partner violence:     Fear of current or ex partner: Not on file     Emotionally abused: Not on file     Physically abused: Not on file     Forced sexual activity: Not on file   Other Topics Concern    Not on file   Social History Narrative    Not on file         Review of Systems:    Review of Systems   Unable to perform ROS: Dementia           Objective:  Blood pressure (!) 108/54, pulse 104, temperature 98.3 °F (36.8 °C), resp. rate 23, height 5' 1\" (1.549 m), weight 112 lb (50.8 kg), SpO2 91 %. Intake/Output Summary (Last 24 hours) at 3/4/2020 0717  Last data filed at 3/4/2020 0545  Gross per 24 hour   Intake 6127 ml   Output 640 ml   Net 5487 ml       Physical Exam  Vitals signs reviewed. Constitutional:       Appearance: She is well-developed. HENT:      Head: Normocephalic and atraumatic.    Eyes:      Conjunctiva/sclera: Conjunctivae normal.      Pupils: Pupils are equal, round, and reactive to PCO2, PO2, HCO3, O2SAT in the last 72 hours. CRP:  No results for input(s): CRP in the last 72 hours. Sed Rate:  No results for input(s): SEDRATE in the last 72 hours. Cultures:   No results for input(s): CULTURE in the last 72 hours. No results for input(s): BC, Brianna Wintere in the last 72 hours. No results for input(s): CXSURG in the last 72 hours. Radiology reports as per the Radiologist  Radiology: Ct Abdomen Pelvis Wo Contrast Additional Contrast? None    Result Date: 3/3/2020  CT ABDOMEN PELVIS WO CONTRAST 3/3/2020 5:15 AM HISTORY: Pain and vomiting. Coffee-ground emesis. COMPARISON: None DLP: 1078 mGy cm. Automated exposure control was utilized to diminish patient radiation dose. TECHNIQUE: Noncontrast enhanced images of the abdomen and pelvis obtained without oral contrast. FINDINGS: There is a 7.7 x 5.5 cm lobular mass within the right lower lobe with postobstructive right lower lobe pneumonia and a small right-sided effusion. This should be considered a neoplasm until otherwise proven with an infectious process also a differential. It appears the patient has undergone bronchoscopy. . The left lung base is clear. The base of the heart is unremarkable. There is some volume loss within the right lower lobe with mild mediastinal shift to the right. No evidence of pericardial effusion. [place] LIVER: There is mild hepatomegaly with steatosis of the liver. . The liver is heterogeneous in appearance. Postcontrast imaging cannot be obtained and I would recommend follow-up with an ultrasound to exclude discrete hepatic lesions. BILIARY SYSTEM: Question some increased density associated with the gallbladder perhaps representing sludge or stones. No biliary dilatation is present. [place] PANCREAS: No focal pancreatic lesion. SPLEEN: Unremarkable. KIDNEYS AND ADRENALS: A punctate nonobstructing calculus is noted lower pole calyx of the right kidney. No discrete renal mass or hydronephrosis.  The adrenals are within normal the patient has undergone recent bronchoscopy. Neoplasm should be excluded. . Signed by Dr Castro Garcia on 3/3/2020 7:02 AM    Xr Chest Portable    Result Date: 3/3/2020  Examination. XR CHEST PORTABLE 3/3/2020 7:00 AM History: Cough. A single, left anterior oblique, portable upright view of the chest is compared with the previous study dated 2/25/2020. The previously seen right infrahilar mass is now partly obscured by the cardiac silhouette in this oblique projection. There are some atelectatic changes in the right lower lung. The Left lung is clear. Heart size is not evaluated. There is no pulmonary congestion or pneumothorax. This left anterior oblique view of the chest is of limited diagnostic value and obscure the previously seen masslike density in the right lower lung. A repeat examination in PA projection may be obtained for optimal evaluation. Above findings are recorded on a digital voice clip in PACS. Signed by Dr Colletta Jointer on 3/3/2020 8:14 AM       Assessment     Hyperkalemia  Resolved. Acute kidney injury. Most likely acute tubular necrosis. Supportive care. Monitor for recovery. Not a candidate for dialysis. Acute liver injury. Most likely ischemic. Supportive care. Acidosis. Change composition of IV fluids. Thrombocytopenia. She may have a component of DIC. Monitor for now. Encephalopathy. Combined toxic metabolic. At this point, she is stable enough for transfer out of ICU.       Sancho Irvin,

## 2020-03-04 NOTE — PROGRESS NOTES
I was called to see the patient upon third floor regarding low blood pressure and rectal bleeding. I greatly appreciate the help of palliative care. Her daughter notes that Ms. Trenda Siemens has been progressing from the standpoint of her underlying dementia. She now presented to the hospital with some coffee-ground emesis, acute liver failure as well as acute kidney injury. I suspect that she probably has had a hypotensive/low-flow state causing multisystem organ failure and she may have some ischemic colitis that is causing her now lower GI bleeding. Given her comorbidities, aggressive care is not warranted. She has been made DNR and actually hospice has been discussed. We will continue to provide supportive care on the third floor and no escalation at this time.

## 2020-03-04 NOTE — PROGRESS NOTES
Nahed Subramanian transferred to 308 from 141 via bed. Reason for transfer: lower level of care  Explained reason for transfer to Patient. Belongings: None; with family. Soft chart transferred with patient: Yes. Telemetry box number N/A transferred with patient: N/A. Report given to: Corrine Turner, via telephone.       Electronically signed by Luis A Osman RN on 3/4/2020 at 2:47 PM

## 2020-03-04 NOTE — PROGRESS NOTES
Bright red bleeding per rectum noticed during 4-eyes assessment when patient arrived to 3rd floor. Unable to obtain blood pressure with Propack or manually at this time. Dr. Rene Brewer notified, wanted patient back in ICU and gave instructions to call him if patient's condition worsens over night and to call hospitalist. Dr. Paul Abernathy notified and came to bedside to assess patient. H&H ordered. No further orders at this time. Will continue to monitor.      Electronically signed by Lauren Cleveland RN on 3/4/2020 at 5:12 PM

## 2020-03-04 NOTE — PROGRESS NOTES
Alia Pretty received from ICU to room # 308 . Mental Status: Patient is oriented and alert. Vitals:    03/04/20 1500   BP: (!) 113/55   Pulse: 112   Resp: 19   Temp:    SpO2: 95%     Placed on cardiac monitor: No.  Belongings: None with patient at bedside . Family at bedside No.  Oriented Patient to room. Call light within reach. Yes. Transfer was: Well tolerated by patient. .    Electronically signed by Amanda Child RN on 3/4/2020 at 4:12 PM

## 2020-03-04 NOTE — PROGRESS NOTES
Nephrology ([id number]) Consult Note      Patient: Reyes Dunn  YOB: 1950  Date of Service: 3/4/2020  MRN: [id number]   Acct: [de-identified]   Primary Care Physician: Jacques Looney MD  Advance Directive: [facility]  Admit Date: 3/3/2020       Hospital Day: 1  Referring Provider: Berta Ribeiro DO    Patient independently seen and examined, Chart, Consults, Notes, Operative notes, Labs, Cardiology, and Radiology studies reviewed as able. Subjective: Reyes Dunn is a 78 y.o. female  whom we were consulted for acute renal failure with hyperkalemia. Chart review shows normal baseline creatinine 1 week prior to admission and history of hyponatremia with a sodium increased from 122-141 over 20 days. At the lab draw done on February 25 her AST and ALT had increased from their normal ranges to 443 at 438 respectively as well. She has history of dementia and is unable to participate in history and physical examination. There is no family present at the bedside. Case was discussed with her nurse Deja Bella at the bedside as well as GI and hospitalist.  There is history of coffee-ground emesis at the nursing home but none demonstrated since admission to the ER. Repeat EKG at the time of the initial examination demonstrated significant improvement in T wave function. Today, no overnight events. She remains unable to fully dissipate history and physical examination due to dementia. No family present.       Allergies:  Influenza vaccines    Medicines:  Current Facility-Administered Medications   Medication Dose Route Frequency Provider Last Rate Last Dose    sodium bicarbonate 150 mEq in dextrose 5 % 1,000 mL infusion   Intravenous Continuous Berta Ribeiro  mL/hr at 03/04/20 1031      acetylcysteine (ACETADOTE) 4,940 mg in dextrose 5 % 1,000 mL infusion  100 mg/kg Intravenous Continuous Landry Cardoza MD 64 mL/hr at 03/04/20 1032 4,940 mg at 03/04/20 1032    dextrose 50 % IV solution  25 g Intravenous PRN Mary Peterson MD   25 g at 03/03/20 0610    sodium chloride flush 0.9 % injection 10 mL  10 mL Intravenous 2 times per day Lili Vicente MD   10 mL at 03/04/20 0827    sodium chloride flush 0.9 % injection 10 mL  10 mL Intravenous PRN Lili Vicente MD        magnesium hydroxide (MILK OF MAGNESIA) 400 MG/5ML suspension 30 mL  30 mL Oral Daily PRN Lili Vicente MD        promethazine (PHENERGAN) tablet 12.5 mg  12.5 mg Oral Q6H PRN Lili Vicente MD        Or    ondansetron [facility]) injection 4 mg  4 mg Intravenous Q6H PRN Lili Vicente MD   4 mg at 03/03/20 2112    potassium chloride (KLOR-CON M) extended release tablet 40 mEq  40 mEq Oral PRN Lili Vicente MD        Or   Louisa Keys potassium bicarb-citric acid (EFFER-K) effervescent tablet 40 mEq  40 mEq Oral PRN Lili Vicente MD        Or   Louisa Keys potassium chloride 10 mEq/100 mL IVPB (Peripheral Line)  10 mEq Intravenous PRN Lili Vicente MD        potassium chloride 10 mEq/100 mL IVPB (Peripheral Line)  10 mEq Intravenous PRN Lili Vicente MD        magnesium sulfate 2 g in 50 mL IVPB premix  2 g Intravenous PRN Lili Vicente MD        calcium gluconate 10 % injection 1 g  1 g Intravenous Once Lili Vicente MD        pantoprazole (PROTONIX) injection 40 mg  40 mg Intravenous BID Lili Vicente MD   40 mg at 03/04/20 0827    And    sodium chloride (PF) 0.9 % injection 10 mL  10 mL Intravenous BID Lili Vicenet MD   10 mL at 03/04/20 1381       Past Medical History:  Past Medical History:   Diagnosis Date    COPD [facility]ch[facility] obstructive pulmonary disease) ([facility] [address]     H/O removal of neck cyst     Hypertension     Lung nodule     RLL    Osteoporosis        Past Surgical History:  Past Surgical History:   Procedure Laterality Date    BLEPHAROPLASTY      BRANCHIAL CYST EXCISION Right 09/30/2016    jared correia     BRONCHOSCOPY N/A 1/17/2020    BRONCHOSCOPY performed by Alejandra Cerna MD at [address] [place] [address] Endoscopy    KNEE CARTILAGE SURGERY  40 yrs ago       Family History  Family History   Problem Relation Age of Onset    Heart Disease Father     Asthma Mother     Diabetes Paternal Grandmother        Social History  Social History     Socioeconomic History    Marital status:      Spouse name: Not on file    Number of children: Not on file    Years of education: Not on file    Highest education level: Not on file   Occupational History    Not on file   Social Needs    Financial resource strain: Not on file    Food insecurity:     Worry: Not on file     Inability: Not on file    Transportation needs:     Medical: Not on file     Non-medical: Not on file   Tobacco Use    Smoking status: Current Every Day Smoker     Packs/day: 0.50     Years: 40.00     Pack years: 20.00    Smokeless tobacco: Never Used   Substance and Sexual Activity    Alcohol use:  Yes    Drug use: No    Sexual activity: Not on file   Lifestyle    Physical activity:     Days per week: Not on file     Minutes per session: Not on file    Stress: Not on file   Relationships    Social connections:     Talks on phone: Not on file     Gets together: Not on file     Attends Anabaptism service: Not on file     Active member of club or organization: Not on file     Attends meetings of clubs or organizations: Not on file     Relationship status: Not on file    Intimate partner violence:     Fear of current or ex partner: Not on file     Emotionally abused: Not on file     Physically abused: Not on file     Forced sexual activity: Not on file   Other Topics Concern    Not on file   Social History Narrative    Not on file         Review of Systems:  History obtained from unobtainable from patient due to age and mental status      Objective:  Patient Vitals for the past 24 hrs:   BP Temp Temp src Pulse Resp SpO2 Weight   03/04/20 1000 116/67 -- -- 111 25 95 % --   03/04/20 0900 (!) 123/57 -- -- 114 22 94 % --   03/04/20 0800 (!) 122/26 96.7 °F (35.9 °C) Temporal 109 21 91 % --   03/04/20 0703 -- -- -- -- 23 91 % --   03/04/20 0700 (!) 126/49 -- -- 115 17 -- --   03/04/20 0600 (!) 108/54 98.3 °F (36.8 °C) -- 104 22 92 % 112 lb (50.8 kg)   03/04/20 0545 (!) 102/46 98.3 °F (36.8 °C) -- 102 21 96 % --   03/04/20 0530 101/71 98.3 °F (36.8 °C) -- 108 26 -- --   03/04/20 0515 (!) 116/55 98.2 °F (36.8 °C) -- 106 25 (!) 88 % --   03/04/20 0400 (!) 108/52 98.1 °F (36.7 °C) -- 96 21 95 % --   03/04/20 0245 (!) 105/55 98.3 °F (36.8 °C) -- 111 26 93 % --   03/04/20 0230 -- -- -- 106 22 95 % --   03/04/20 0215 112/78 98.1 °F (36.7 °C) -- 104 22 94 % --   03/04/20 0200 (!) 107/59 -- -- 108 22 94 % --   03/04/20 0130 (!) 115/59 98.3 °F (36.8 °C) -- 108 20 94 % --   03/04/20 0030 (!) 123/39 98.3 °F (36.8 °C) -- 107 22 94 % --   03/04/20 0000 (!) 99/31 -- -- 108 25 93 % --   03/03/20 2345 119/62 -- -- 101 20 94 % --   03/03/20 2330 109/66 98 °F (36.7 °C) -- 108 23 93 % --   03/03/20 1800 110/78 -- -- 104 20 93 % --   03/03/20 1700 111/66 -- -- 115 22 92 % --   03/03/20 1600 126/65 98.1 °F (36.7 °C) Temporal 105 20 95 % --   03/03/20 1500 128/67 -- -- 102 20 96 % --   03/03/20 1449 -- -- -- -- 20 95 % --   03/03/20 1400 114/62 -- -- 115 22 94 % --   03/03/20 1300 110/68 -- -- 113 22 95 % --   03/03/20 1200 139/68 97.8 °F (36.6 °C) Temporal 96 18 96 % --       Intake/Output Summary (Last 24 hours) at 3/4/2020 1117  Last data filed at 3/4/2020 0800  Gross per 24 hour   Intake 5927 ml   Output 625 ml   Net 5302 ml     General: Awake but unable to participate in the history and physical examination due to dementia  Chest:  clear to auscultation bilaterally without respiratory distress  CVS: regular rate and rhythm  Abdominal: soft, nontender, normal bowel sounds  Extremities: no cyanosis or edema  Skin: warm and dry without rash      Labs:  BMP:   Recent Labs     03/03/20  1807 03/04/20  0035 03/04/20  0606    136 137   K 4.7 4.3 4.3   CL 99 96* 98   CO2 17* 16* 16*   PHOS  --  3.4 this oblique projection. There are some atelectatic changes in the right lower lung. The Left lung is clear. Heart size is not evaluated. There is no pulmonary congestion or pneumothorax. This left anterior oblique view of the chest is of limited diagnostic value and obscure the previously seen masslike density in the right lower lung. A repeat examination in PA projection may be obtained for optimal evaluation. Above findings are recorded on a digital voice clip in PACS. Signed by Dr Janice Andre on 3/3/2020 8:14 AM       Assessment     Acute renal failure/ATN  Right lower lobe lung mass  Recent episode of hyponatremia  Lactic/metabolic acidosis  Elevated LFTs- shock liver  Hyperkalemia  Vitamin D deficiency  Secondary hyperparathyroidism    Plan:  Discussed with nursing, GI, hospitalist  Given patient's constellation of medical findings including chronic dementia, she would not be an appropriate candidate for dialysis   Hopefully would be no need for dialysis anyway at this point given improved EKG findings and potassium levels  Monitor sodium closely given lung cancer  Continue to medically manage potassium as needed  Add vitamin D  Continue IV fluids    Thank you for the consult, we appreciate the opportunity to provide care to your patients. Feel free to contact me if I can be of any further assistance.       Sunshine Samayoa MD  03/04/20  11:17 AM

## 2020-03-05 PROBLEM — K72.90 HEPATIC FAILURE, WITHOUT COMA (HCC): Status: ACTIVE | Noted: 2020-01-01

## 2020-03-05 NOTE — PROGRESS NOTES
Speech Language Pathology  Facility/Department: [facility]/MED   CLINICAL BEDSIDE SWALLOW EVALUATION  SPEECH PRODUCTION EVALUATION    NAME: Ana Hernandez  : 1950  MRN: [id number]    ADMISSION DATE: 3/3/2020  ADMITTING DIAGNOSIS: has Glucose intolerance (impaired glucose tolerance); Osteoporosis; Essential hypertension; Right lower lobe lung mass; Hyperkalemia; Acute kidney injury ([address] and Elevated liver enzymes on their problem list.    Date of Eval: 3/5/2020  Evaluating Therapist: Mary Barrett    Reason for Referral  Ana Hernandez was referred for a bedside swallow evaluation to assess the efficiency of her swallow function, identify signs and symptoms of aspiration and make recommendations regarding safe dietary consistencies, effective compensatory strategies, and safe eating environment. Impression  Assessed patient's swallowing function. Patient exhibits decreased oral prep of more solid consistencies, slow oral transit and suspected swallow delay with even the slowest moving food/drink consistencies possible, and sluggish, mild-moderately decreased laryngeal elevation for swallow airway protection. Even so, no outward S/S penetration/aspiration was noted with any ice chip trial, puree consistency trial, honey thick liquid trial, nectar thick liquid trial, or thin H2O trial administered during evaluation this date. At this time, would trial puree consistency and honey thick liquids (CHECK FOR POCKETING). TOTAL FEED. Drinks by spoon. Recommend meds crushed in pudding/applesauce. Will continue to follow. Thank you for this consult. Treatment Plan  Requires SLP Intervention: Yes     Recommended Diet and Intervention  Diet Solids Recommendation: Dysphagia Pureed (Trial)  Liquid Consistency Recommendation: Honey (Trial)  Recommended Form of Meds: Crushed in puree as able  Therapeutic Interventions: Patient/Family education;Diet tolerance monitoring; Therapeutic PO trials with SLP Compensatory Swallowing Strategies  Compensatory Swallowing Strategies: Upright as possible for all oral intake; Total feed;Small bites/sips;Eat/Feed slowly; Alternate solids and liquids; Remain upright for 30-45 minutes after meals     Treatment/Goals  Timeframe for Short-term Goals: 1x/day for 3 days   Goal 1: Patient will tolerate puree consistency and honey thick liquids with min S/S penetration/aspiration during PO intake. Goal 2: Patient staff will follow swallow safety recommendations to decrease risk of penetration/aspiration during PO intake. Goal 3: Re-assessment of swallow function for potential diet upgrade. Goal 4: Monitor speech production. General  Chart Reviewed: Yes  Behavior/Cognition: Lethargic   O2 Device: Nasal Cannula   Communication Observation: (Assessed patient's speech production. Patient exhibits dysarthria primarily characterized by low volume/weak voicing as well as slow, decreased lingual movements. SLP ranked functional intelligibility for unfamiliar listeners at 80-90% without background noise present.)  Follows Directions: Simple   Dentition: Poor   Patient Positioning: Upright in bed  Volitional Cough: Weak  Volitional Swallow: Delayed  Consistencies Administered: Ice chips;Dysphagia Pureed (Dysphagia I); Honey - spoon;Nectar - spoon; Thin - spoon      Oral Motor Deficits  Labial ROM: (Decreased, bilaterally, during labial retraction trials and labial protrusion trials.)  Labial Strength: Reduced(Decreased during labial compression trials.)  Labial Coordination: Reduced(Slowed movements were noted.)  Lingual ROM: (Decreased during lingual extension trials with no full point achieved; decreased during lingual elevation trials without use of accessory jaw movement; decreased movements noted bilaterally.)  Lingual Strength: Decreased  Lingual Coordination: (Slow movements were noted.)     Assessed patient's swallowing function with the following observations noted:     Oral

## 2020-03-05 NOTE — PLAN OF CARE
Problem: Falls - Risk of:  Goal: Will remain free from falls  Description  Will remain free from falls  Outcome: Ongoing  Goal: Absence of physical injury  Description  Absence of physical injury  Outcome: Ongoing     Problem: Fluid Volume:  Goal: Ability to achieve a balanced intake and output will improve  Description  Ability to achieve a balanced intake and output will improve  Outcome: Ongoing     Problem: Infection:  Goal: Will remain free from infection  Description  Will remain free from infection  Outcome: Ongoing     Problem: Safety:  Goal: Free from accidental physical injury  Description  Free from accidental physical injury  Outcome: Ongoing  Goal: Free from intentional harm  Description  Free from intentional harm  Outcome: Ongoing     Problem: Daily Care:  Goal: Daily care needs are met  Description  Daily care needs are met  Outcome: Ongoing     Problem: Pain:  Description  Pain management should include both nonpharmacologic and pharmacologic interventions.   Goal: Patient's pain/discomfort is manageable  Description  Patient's pain/discomfort is manageable  Outcome: Ongoing  Goal: Pain level will decrease  Description  Pain level will decrease  Outcome: Ongoing  Goal: Control of acute pain  Description  Control of acute pain  Outcome: Ongoing  Goal: Control of chronic pain  Description  Control of chronic pain  Outcome: Ongoing     Problem: Skin Integrity:  Goal: Skin integrity will stabilize  Description  Skin integrity will stabilize  Outcome: Ongoing     Problem: Discharge Planning:  Goal: Patients continuum of care needs are met  Description  Patients continuum of care needs are met  Outcome: Ongoing

## 2020-03-05 NOTE — PROGRESS NOTES
Nephrology ([facility]) Consult Note      Patient: Pierre Eaton  YOB: 1950  Date of Service: 3/5/2020  MRN: [id number]   Acct: [de-identified]   Primary Care Physician: Thanh Hare MD  Advance Directive: [facility]  Admit Date: 3/3/2020       Hospital Day: 2  Referring Provider: Isabella Cornlel MD    Patient independently seen and examined, Chart, Consults, Notes, Operative notes, Labs, Cardiology, and Radiology studies reviewed as able. Subjective: Pierre Eaton is a 78 y.o. female  whom we were consulted for acute renal failure with hyperkalemia. Chart review shows normal baseline creatinine 1 week prior to admission and history of hyponatremia with a sodium increased from 122-141 over 20 days. At the lab draw done on February 25 her AST and ALT had increased from their normal ranges to 443 at 438 respectively as well. She has history of dementia and is unable to participate in history and physical examination. There is no family present at the bedside. Case was discussed with her nurse Alvarez Rausch at the bedside as well as GI and hospitalist.  There is history of coffee-ground emesis at the nursing home but none demonstrated since admission to the ER. Repeat EKG at the time of the initial examination demonstrated significant improvement in T wave function. Today, no overnight events. She remains unable to fully participate in the history and physical examination due to dementia. No family present. Nursing present at the bedside. Bright red blood per rectum overnight noted.       Allergies:  Influenza vaccines    Medicines:  Current Facility-Administered Medications   Medication Dose Route Frequency Provider Last Rate Last Dose    calcium-cholecalciferol 500-200 MG-UNIT per tablet 1 tablet  1 tablet Oral Daily Farzana Ellison PA-C        0.9 % sodium chloride bolus  20 mL Intravenous Once Terri Borges MD        sodium bicarbonate 150 mEq in dextrose 5 % 1,000 mL infusion   Intravenous Continuous Tobi Colla,  mL/hr at 03/05/20 0106      acetylcysteine (ACETADOTE) 4,940 mg in dextrose 5 % 1,000 mL infusion  100 mg/kg Intravenous Continuous Matt Valle MD 64 mL/hr at 03/05/20 0356 4,940 mg at 03/05/20 0356    vitamin D (ERGOCALCIFEROL) capsule 50,000 Units  50,000 Units Oral Weekly Edgardo Montes De Oca MD        morphine injection 1 mg  1 mg Intravenous Q4H PRN Nadeemanna Colla, DO   1 mg at 03/04/20 2045    dextrose 50 % IV solution  25 g Intravenous PRN Tobi Colla, DO   25 g at 03/03/20 5584    sodium chloride flush 0.9 % injection 10 mL  10 mL Intravenous 2 times per day Tobi Colla, DO   10 mL at 03/04/20 2045    sodium chloride flush 0.9 % injection 10 mL  10 mL Intravenous PRN Tobi Rodrigeza, DO        magnesium hydroxide (MILK OF MAGNESIA) 400 MG/5ML suspension 30 mL  30 mL Oral Daily PRN Tobi Colla, DO        promethazine (PHENERGAN) tablet 12.5 mg  12.5 mg Oral Q6H PRN Tobi Colla, DO        Or    ondansetron [facility]) injection 4 mg  4 mg Intravenous Q6H PRN Nadeemanna Colla, DO   4 mg at 03/04/20 2054    potassium chloride (KLOR-CON M) extended release tablet 40 mEq  40 mEq Oral PRN Nadeemanna Colla, DO        Or    potassium bicarb-citric acid (EFFER-K) effervescent tablet 40 mEq  40 mEq Oral PRN Nadeemanna Colla, DO        Or    potassium chloride 10 mEq/100 mL IVPB (Peripheral Line)  10 mEq Intravenous PRN Nadeemanna Colla, DO        potassium chloride 10 mEq/100 mL IVPB (Peripheral Line)  10 mEq Intravenous PRN Nadeemanna Colla, DO        magnesium sulfate 2 g in 50 mL IVPB premix  2 g Intravenous PRN Tobi Colla, DO        calcium gluconate 10 % injection 1 g  1 g Intravenous Once Tobi Rodrigeza, DO        pantoprazole (PROTONIX) injection 40 mg  40 mg Intravenous BID Nadeemanna Colla, DO   40 mg at 03/04/20 2044    And    sodium chloride (PF) 0.9 % injection 10 mL  10 mL Intravenous BID James Gilliam DO   10 mL at 03/04/20 7302       Past Medical History:  Past Medical History:   Diagnosis Date    COPD (chronic obstructive pulmonary disease) ([facility] [address]     H/O removal of neck cyst     Hypertension     Lung nodule     RLL    Osteoporosis        Past Surgical History:  Past Surgical History:   Procedure Laterality Date    BLEPHAROPLASTY      BRANCHIAL CYST EXCISION Right 09/30/2016    jared correia     BRONCHOSCOPY N/A 1/17/2020    BRONCHOSCOPY performed by Cherrie Samano MD at [facility]    KNEE CARTILAGE SURGERY  36 yrs ago       Family History  Family History   Problem Relation Age of Onset    Heart Disease Father     Asthma Mother     Diabetes Paternal Grandmother        Social History  Social History     Socioeconomic History    Marital status:      Spouse name: Not on file    Number of children: Not on file    Years of education: Not on file    Highest education level: Not on file   Occupational History    Not on file   Social Needs    Financial resource strain: Not on file    Food insecurity:     Worry: Not on file     Inability: Not on file    Transportation needs:     Medical: Not on file     Non-medical: Not on file   Tobacco Use    Smoking status: Current Every Day Smoker     Packs/day: 0.50     Years: 40.00     Pack years: 20.00    Smokeless tobacco: Never Used   Substance and Sexual Activity    Alcohol use:  Yes    Drug use: No    Sexual activity: Not on file   Lifestyle    Physical activity:     Days per week: Not on file     Minutes per session: Not on file    Stress: Not on file   Relationships    Social connections:     Talks on phone: Not on file     Gets together: Not on file     Attends Restorationist service: Not on file     Active member of club or organization: Not on file     Attends meetings of clubs or organizations: Not on file     Relationship status: Not on file    Intimate partner violence:     Fear 03/03/20  0422 03/03/20  0708 03/04/20  0035 03/05/20  0136   AST 2,111* 1,753* 1,639* 1,696*   ALT 1,784* 1,458* 1,382* 1,209*   LIPASE 227*  --   --   --    BILIDIR  --   --  5.3*  --    BILITOT 6.0* 5.0* 5.6* 5.4*   ALKPHOS 351* 292* 301* 287*     PT/INR:   Recent Labs     03/03/20  0422   PROTIME 16.5*   INR 1.33*     APTT:   Recent Labs     03/03/20 0422   APTT 26.6     BNP:  No results for input(s): BNP in the last 72 hours. Ionized Calcium:No results for input(s): IONCA in the last 72 hours. Magnesium:  Recent Labs     03/04/20  0035   MG 2.1     Phosphorus:  Recent Labs     03/04/20  0035   PHOS 3.4     HgbA1C: No results for input(s): LABA1C in the last 72 hours. Hepatic:   Recent Labs     03/03/20  0708 03/04/20  0035 03/05/20  0136   ALKPHOS 292* 301* 287*   ALT 1,458* 1,382* 1,209*   AST 1,753* 1,639* 1,696*   PROT 4.7* 4.6* 3.9*   BILITOT 5.0* 5.6* 5.4*   BILIDIR  --  5.3*  --    LABALBU 3.0* 2.4* 2.1*     Lactic Acid:   Recent Labs     03/03/20  0436   LACTA 4.1*     Troponin: No results for input(s): CKTOTAL, CKMB, TROPONINT in the last 72 hours. ABGs: No results for input(s): PH, PCO2, PO2, HCO3, O2SAT in the last 72 hours. CRP:  No results for input(s): CRP in the last 72 hours. Sed Rate:  No results for input(s): SEDRATE in the last 72 hours. Cultures:   No results for input(s): CULTURE in the last 72 hours. Recent Labs     03/03/20  1012 03/03/20  1014   BC No Growth to date. Any change in status will be called. --    BLOODCULT2  --  No Growth to date. Any change in status will be called. No results for input(s): CXSURG in the last 72 hours. Radiology reports as per the Radiologist  Radiology: Ct Abdomen Pelvis Wo Contrast Additional Contrast? None    Result Date: 3/3/2020  CT ABDOMEN PELVIS WO CONTRAST 3/3/2020 5:15 AM HISTORY: Pain and vomiting. Coffee-ground emesis. COMPARISON: None DLP: 1078 mGy cm.  Automated exposure control was utilized to diminish patient radiation dose. TECHNIQUE: Noncontrast enhanced images of the abdomen and pelvis obtained without oral contrast. FINDINGS: There is a 7.7 x 5.5 cm lobular mass within the right lower lobe with postobstructive right lower lobe pneumonia and a small right-sided effusion. This should be considered a neoplasm until otherwise proven with an infectious process also a differential. It appears the patient has undergone bronchoscopy. . The left lung base is clear. The base of the heart is unremarkable. There is some volume loss within the right lower lobe with mild mediastinal shift to the right. No evidence of pericardial effusion. [place] LIVER: There is mild hepatomegaly with steatosis of the liver. . The liver is heterogeneous in appearance. Postcontrast imaging cannot be obtained and I would recommend follow-up with an ultrasound to exclude discrete hepatic lesions. BILIARY SYSTEM: Question some increased density associated with the gallbladder perhaps representing sludge or stones. No biliary dilatation is present. [place] PANCREAS: No focal pancreatic lesion. SPLEEN: Unremarkable. KIDNEYS AND ADRENALS: A punctate nonobstructing calculus is noted lower pole calyx of the right kidney. No discrete renal mass or hydronephrosis. The adrenals are within normal limits. .  The ureters are difficult to trace but I do not see definite evidence of obstructive uropathy. [place] RETROPERITONEUM: No mass, lymphadenopathy or hemorrhage. GI TRACT: There is mild fecal impaction within the rectal vault. There is mild constipation. No evidence of mechanical obstruction or focal bowel wall thickening. No evidence of gastric outlet obstruction. . The appendix is visualized and unremarkable. OTHER: No evidence of mesenteric mass or adenopathy. There is some free fluid in the right paracolic gutter with mild stranding which is nonspecific. There is also some free fluid within the pelvis. Mild presacral thickening is present as well some thickening of the perirectal fat. . The osseous structures and soft tissues demonstrate no worrisome lesions. No evidence of a ventral wall hernia. PELVIS: The uterus and adnexa are unremarkable. There is some free fluid in the cul-de-sac as well as presacral thickening. There is mild fecal impaction within the rectal vault. . The urinary bladder is normal in appearance. 1. There is mild constipation. There is some mild fecal impaction within the rectal vault. Perirectal stranding as well as some stranding within the presacral space is present which are nonspecific findings. There is some free fluid within the pelvis and right paracolic gutter with mild associated stranding. This is also a nonspecific finding. No evidence of mechanical bowel obstruction. 2. There is hepatomegaly. Liver is diffusely heterogeneous which is in part related to steatosis. Follow-up with enhanced CT imaging of the abdomen or ultrasound is recommended to more entirely exclude discrete hepatic lesions. 3. Punctate nonobstructing calculus lower pole calyx right kidney. No convincing evidence of obstructive uropathy although the ureters are difficult to trace secondary to the lack of intra-abdominal fat. 4. Lobular mass in the right lower lobe with a small right-sided effusion. It appears the patient has undergone recent bronchoscopy. Neoplasm should be excluded. . Signed by Dr Yola Cruz on 3/3/2020 7:02 AM    Xr Chest Portable    Result Date: 3/3/2020  Examination. XR CHEST PORTABLE 3/3/2020 7:00 AM History: Cough. A single, left anterior oblique, portable upright view of the chest is compared with the previous study dated 2/25/2020. The previously seen right infrahilar mass is now partly obscured by the cardiac silhouette in this oblique projection. There are some atelectatic changes in the right lower lung. The Left lung is clear. Heart size is not evaluated. There is no pulmonary congestion or pneumothorax.     This left anterior oblique view of the chest is of limited diagnostic value and obscure the previously seen masslike density in the right lower lung. A repeat examination in PA projection may be obtained for optimal evaluation. Above findings are recorded on a digital voice clip in PACS. Signed by Dr Shane Haider on 3/3/2020 8:14 AM       Assessment     Acute renal failure/ATN  Right lower lobe lung mass  Recent episode of hyponatremia  Lactic/metabolic acidosis  Elevated LFTs- shock liver  Hyperkalemia  Vitamin D deficiency  Secondary hyperparathyroidism    Plan:  Discussed with nursing, patient  Given patient's constellation of medical findings including chronic dementia, she would not be an appropriate candidate for dialysis, would recommend hospice given her constellation of findings  Monitor sodium closely given lung cancer  Continue to medically manage potassium as needed  Vitamin D  Continue IV fluids    Thank you for the consult, we appreciate the opportunity to provide care to your patients. Feel free to contact me if I can be of any further assistance.       Hugo De Jesus MD  03/05/20  11:11 AM

## 2020-03-05 NOTE — PROGRESS NOTES
Progress Note    Date:3/5/2020       Room:0308/308-01  Patient Name:Lori Sesay     YOB: 1950     Age:69 y.o. Subjective   Rectal bleed fresh blood in stool  Patient's present condition is moderate amount multiple morbid clinical conditions lung cancer with recently. Liver failure possible from Tylenol toxicity or hypoxic hepatopathy after lung biopsy, N-acetylcysteine on board. Patient is in DNR, family still confused, of ICU last night and found to have fresh blood still hypotensive significant thrombocytopenia 25,000, possible sepsis, patient is confused encephalopathic. Review of Systems   Review of Systems   Constitutional: Positive for activity change, appetite change and fatigue. Eyes: Positive for visual disturbance. Respiratory: Positive for chest tightness and shortness of breath. Cardiovascular: Positive for palpitations. Gastrointestinal: Positive for blood in stool. Endocrine: Negative. Genitourinary: Negative. Musculoskeletal: Negative. Skin: Positive for pallor and rash. Neurological: Positive for speech difficulty and numbness. Hematological: Bruises/bleeds easily. Psychiatric/Behavioral: Positive for confusion.        Medications   Scheduled Meds:    calcium-cholecalciferol  1 tablet Oral Daily    sodium chloride  20 mL Intravenous Once    vitamin D  50,000 Units Oral Weekly    sodium chloride flush  10 mL Intravenous 2 times per day    calcium gluconate  1 g Intravenous Once    pantoprazole  40 mg Intravenous BID    And    sodium chloride (PF)  10 mL Intravenous BID     Continuous Infusions:    IV infusion builder 100 mL/hr at 03/05/20 0106    acetylcysteine (ACETADOTE) infusion *third dose* 4,940 mg (03/05/20 0356)     PRN Meds: morphine, dextrose, sodium chloride flush, magnesium hydroxide, promethazine **OR** ondansetron, potassium chloride **OR** potassium alternative oral replacement **OR** potassium chloride, potassium chloride, 03/04/20  1839   WBC 12.3* 10.1 10.2  --    RBC 3.92* 3.62* 3.14*  --    HGB 12.7 11.8* 10.4* 8.3*   HCT 38.9 35.6* 31.5* 24.2*   MCV 99.2* 98.3 100.3*  --    RDW 16.0* 15.9* 16.2*  --    PLT 39* 31* 25*  --      CHEMISTRIES:  Recent Labs     03/04/20  0035 03/04/20  0606 03/05/20  0136    137 131*   K 4.3 4.3 3.9   CL 96* 98 82*   CO2 16* 16* 21*   * 105* 104*   CREATININE 3.3* 3.5* 3.7*   GLUCOSE 176* 164* 473*   PHOS 3.4  --   --    MG 2.1  --   --      PT/INR:  Recent Labs     03/03/20 0422   PROTIME 16.5*   INR 1.33*     APTT:  Recent Labs     03/03/20 0422   APTT 26.6     LIVER PROFILE:  Recent Labs     03/03/20  0708 03/04/20  0035 03/05/20  0136   AST 1,753* 1,639* 1,696*   ALT 1,458* 1,382* 1,209*   BILIDIR  --  5.3*  --    BILITOT 5.0* 5.6* 5.4*   ALKPHOS 292* 301* 287*       Imaging Last 24 Hours:  No results found. Assessment      1. Patient's clinical status significant morbidity possible more rebound patient is DNR  2. Hospital sepsis with renal failure hyperkalemia resolved. Liver failure meld over 30 patient's not oriented confused encephalopathic   3. On N-acetylcysteine IV for Tylenol toxicity possible along with hypoxic hepatopathy part. 4.  Profound thrombocytopenia platelets 88,942 K possible sepsis hepatic failure   4a rectal bleed fresh blood possible ischemic origin ischemic colopathy  5. Discuss around the nurses and hospice care at this time looking at the clinical scenario risk-benefit ratio giving GoLYTELY for colonoscopy patient might aspirate on top confused hypotensive septic thrombocytopenic for diagnostic purposes or even therapeutic purposes for colonoscopy would be more risk patient months at home prior and GoLYTELY prep might not be optimal in this situation. Hospital Problems           Last Modified POA    Hyperkalemia 3/3/2020 Yes    Acute kidney injury ([address] 3/4/2020 Yes    Elevated liver enzymes 3/4/2020 Yes          Plan:        1.   Continue

## 2020-03-05 NOTE — PROGRESS NOTES
Palliative Care Progress Note  3/5/2020 11:02 AM  Subjective:   Admit Date: 3/3/2020  PCP: Ruthy Wilks MD    Chief Complaint: Abd pain    Interval History: Patient transferred from ICU to regular floor. Had rectal bleeding last night, spontaneous bleeding on extremities, and some coffee ground secretions yesterday. Plt 25 today. She has continued to decline, remains weak and hypotensive, lethargic. Continued discussion regarding goals of care and possible hospice. Review of Systems   Unable to obtain due to lethargy, grimaces when moved or abdomen touched. DIET DYSPHAGIA PUREED;  Moderately Thick (Honey)    Medications:   IV infusion builder 100 mL/hr at 03/05/20 0106    acetylcysteine (ACETADOTE) infusion *third dose* 4,940 mg (03/05/20 0356)     Current Facility-Administered Medications   Medication Dose Route Frequency Provider Last Rate Last Dose    calcium-cholecalciferol 500-200 MG-UNIT per tablet 1 tablet  1 tablet Oral Daily Xochitl Foster PA-C        0.9 % sodium chloride bolus  20 mL Intravenous Once Fredy Arroyo MD        sodium bicarbonate 150 mEq in dextrose 5 % 1,000 mL infusion   Intravenous Continuous Sadiq Pacer,  mL/hr at 03/05/20 0106      acetylcysteine (ACETADOTE) 4,940 mg in dextrose 5 % 1,000 mL infusion  100 mg/kg Intravenous Continuous Fredy Arroyo MD 64 mL/hr at 03/05/20 0356 4,940 mg at 03/05/20 0356    vitamin D (ERGOCALCIFEROL) capsule 50,000 Units  50,000 Units Oral Weekly Silverio Menchaca MD        morphine injection 1 mg  1 mg Intravenous Q4H PRN Sadiq Pacer, DO   1 mg at 03/04/20 2045    dextrose 50 % IV solution  25 g Intravenous PRN Sadiq Pacer, DO   25 g at 03/03/20 0323    sodium chloride flush 0.9 % injection 10 mL  10 mL Intravenous 2 times per day Sadiq Pacer, DO   10 mL at 03/04/20 2045    sodium chloride flush 0.9 % injection 10 mL  10 mL Intravenous PRN Sadiq Pacer, DO        magnesium hydroxide (MILK

## 2020-03-05 NOTE — PROGRESS NOTES
Assisted patient with sip of water. Patient exhibiting difficulty swallowing with water trickling out of mouth. Changed patient to NPO and notified hospitalist for possible SLP eval.  Order had been receieved for PO calcium overnight for low calcium lab; unable to safely give to patient at this time. Dr. Joseline Luna notified of this also. Oral care provided, mouth swabbed to the best patient could tolerate.   Electronically signed by Guero Kapadia RN on 3/5/2020 at 7:37 AM

## 2020-03-08 LAB
BLOOD CULTURE, ROUTINE: NORMAL
CULTURE, BLOOD 2: NORMAL
EKG P AXIS: 74 DEGREES
EKG P AXIS: 77 DEGREES
EKG P-R INTERVAL: 126 MS
EKG P-R INTERVAL: 134 MS
EKG Q-T INTERVAL: 294 MS
EKG Q-T INTERVAL: 312 MS
EKG QRS DURATION: 82 MS
EKG QRS DURATION: 88 MS
EKG QTC CALCULATION (BAZETT): 396 MS
EKG QTC CALCULATION (BAZETT): 399 MS
EKG T AXIS: 75 DEGREES
EKG T AXIS: 78 DEGREES

## 2023-12-10 NOTE — ED PROVIDER NOTES
[address] [place] [address] EMERGENCY DEPT  eMERGENCY dEPARTMENT eNCOUnter      Pt Name: Rocio Conner  MRN: [id number]  [place] 1950  Date of evaluation: 2/25/2020  Provider: Stephanie Mccallum MD    [address] [place] [address]       Chief Complaint   Patient presents with    Loss of Consciousness         HISTORY OF PRESENT ILLNESS   (Location/Symptom, Timing/Onset,Context/Setting, Quality, Duration, Modifying Factors, Severity)  Note limiting factors. Rocio Conner is a 78 y.o. female who presents to the emergency department      The history is provided by the patient and a relative. History limited by: pt amnestic, and apparent dementia. Loss of Consciousness   Episode history:  Single  Most recent episode: Today  Duration: unknown. Progression:  Resolved  Chronicity:  Recurrent  Context comment:  Unknown. recent admission for confusion - sent to NH from there. MRI brain neg. Lung mass found. Witnessed: unknown. Associated symptoms comment:  PT WITHOUT COMPLAINTS THEN AND NOW. DENIES INJURY. NursingNotes were reviewed. REVIEW OF SYSTEMS    (2-9 systems for level 4, 10 or more for level 5)     Review of Systems   Cardiovascular: Positive for syncope. All other systems reviewed and are negative. Except as noted above the remainder of the review of systems was reviewed and negative.        PAST MEDICAL HISTORY     Past Medical History:   Diagnosis Date    COPD (chronic obstructive pulmonary disease) ([facility] [address]     H/O removal of neck cyst     Hypertension     Lung nodule     RLL    Osteoporosis          SURGICALHISTORY       Past Surgical History:   Procedure Laterality Date    BLEPHAROPLASTY      BRANCHIAL CYST EXCISION Right 09/30/2016    jared correia     BRONCHOSCOPY N/A 1/17/2020    BRONCHOSCOPY performed by Margy Ladd MD at [address] [place] [address] Endoscopy    KNEE CARTILAGE SURGERY  40 yrs ago         CURRENT MEDICATIONS       Discharge Medication List as of 2/25/2020  6:09 PM      CONTINUE these medications which have Non-plain filmimages such as CT, Ultrasound and MRI are read by the radiologist. Plain radiographic images are visualized and preliminarily interpreted by the emergency physician with the below findings:        Interpretation per the Radiologist below, if available at the time ofthis note:    XR CHEST PORTABLE   Final Result   1. No acute findings or significant change. 2.  Redemonstrated masslike opacity in the RIGHT lower lobe present on   prior chest CT with differential including allergic bronchopulmonary   aspergillosis and less likely, neoplasm.    Signed by Dr Melody Ricks on 2/25/2020 4:47 PM            ED BEDSIDE ULTRASOUND:   Performed by ED Physician - none    LABS:  Labs Reviewed   CBC WITH AUTO DIFFERENTIAL - Abnormal; Notable for the following components:       Result Value    RBC 4.07 (*)     .0 (*)     MCH 32.7 (*)     MCHC 32.7 (*)     Platelets 912 (*)     Neutrophils % 86.8 (*)     Lymphocytes % 5.0 (*)     Neutrophils Absolute 8.1 (*)     Lymphocytes Absolute 0.5 (*)     All other components within normal limits   COMPREHENSIVE METABOLIC PANEL W/ REFLEX TO MG FOR LOW K - Abnormal; Notable for the following components:    Potassium reflex Magnesium 3.2 (*)     Chloride 96 (*)     Glucose 143 (*)     BUN 43 (*)     Total Protein 6.3 (*)     Total Bilirubin 1.5 (*)     Alkaline Phosphatase 155 (*)      (*)      (*)     All other components within normal limits   URINE RT REFLEX TO CULTURE - Abnormal; Notable for the following components:    Clarity, UA CLOUDY (*)     Bilirubin Urine SMALL (*)     Ketones, Urine 15 (*)     Protein,  (*)     Leukocyte Esterase, Urine SMALL (*)     All other components within normal limits   MICROSCOPIC URINALYSIS - Abnormal; Notable for the following components:    WBC, UA 11-15 (*)     All other components within normal limits   CULTURE, URINE    Narrative:     ORDER#: [id number]                          ORDERED BY: KOBE QUIRINOPARDEEP  SOURCE: Urine Clean Catch                  COLLECTED:  02/25/20 16:45  ANTIBIOTICS AT RIO.:                      RECEIVED :  02/25/20 16:49   MAGNESIUM       All other labs were within normal range or not returned as of this dictation. EMERGENCY DEPARTMENT COURSE and DIFFERENTIAL DIAGNOSIS/MDM:   Vitals:    Vitals:    02/25/20 1700 02/25/20 1730 02/25/20 1825 02/25/20 1832   BP: (!) 183/92 (!) 181/97 (!) 186/90 (!) 168/87   Pulse: 62 65  74   Resp: 18 16  19   Temp:       TempSrc:       SpO2: 94% 93%  94%   Weight:       Height:               MDM    CRITICAL CARE TIME   Total Critical Care time was 0 minutes, excluding separately reportable procedures. There was a high probability of clinically significant/lifethreatening deterioration in the patient's condition which required my urgent intervention. CONSULTS:  None    PROCEDURES:  Unless otherwise noted below, none     Procedures    FINAL IMPRESSION      1.  Syncope and collapse          DISPOSITION/PLAN   DISPOSITION        PATIENT REFERRED TO:  Elias Davis MD  [address] [place] [id number] [phone]      As needed      DISCHARGE MEDICATIONS:  Discharge Medication List as of 2/25/2020  6:09 PM             (Please note that portions of this note were completed with a voice recognition program.  Efforts were made to edit the dictations but occasionally words are mis-transcribed.)    Ofelia Justin MD (electronically signed)  Attending Emergency Physician          Ofelia Justin MD  02/26/20 5719 no concerns

## (undated) DEVICE — SINGLE USE SUCTION VALVE MAJ-209: Brand: SINGLE USE SUCTION VALVE (STERILE)

## (undated) DEVICE — ENDO KIT,LOURDES HOSPITAL: Brand: MEDLINE INDUSTRIES, INC.

## (undated) DEVICE — BRONCHOSCOPE CYTOLOGY BRUSH: Brand: COOK

## (undated) DEVICE — SINGLE USE BIOPSY VALVE MAJ-210: Brand: SINGLE USE BIOPSY VALVE (STERILE)

## (undated) DEVICE — FORCEPS BX L100CM DIA1.8MM WRK CHN 2MM PULM S STL RAD JAW 4